# Patient Record
Sex: MALE | Race: WHITE | Employment: UNEMPLOYED | URBAN - METROPOLITAN AREA
[De-identification: names, ages, dates, MRNs, and addresses within clinical notes are randomized per-mention and may not be internally consistent; named-entity substitution may affect disease eponyms.]

---

## 2023-11-27 ENCOUNTER — APPOINTMENT (OUTPATIENT)
Dept: GENERAL RADIOLOGY | Age: 43
End: 2023-11-27

## 2023-11-27 ENCOUNTER — HOSPITAL ENCOUNTER (EMERGENCY)
Age: 43
Discharge: HOME | End: 2023-11-27
Attending: EMERGENCY MEDICINE

## 2023-11-27 VITALS
RESPIRATION RATE: 18 BRPM | WEIGHT: 230 LBS | HEART RATE: 124 BPM | OXYGEN SATURATION: 98 % | TEMPERATURE: 97.9 F | BODY MASS INDEX: 32.2 KG/M2 | HEIGHT: 71 IN | SYSTOLIC BLOOD PRESSURE: 127 MMHG | DIASTOLIC BLOOD PRESSURE: 91 MMHG

## 2023-11-27 DIAGNOSIS — T78.40XA ALLERGIC REACTION, INITIAL ENCOUNTER: Primary | ICD-10-CM

## 2023-11-27 DIAGNOSIS — L50.9 URTICARIA: ICD-10-CM

## 2023-11-27 DIAGNOSIS — I50.9 ACUTE ON CHRONIC CONGESTIVE HEART FAILURE, UNSPECIFIED HEART FAILURE TYPE (HCC): ICD-10-CM

## 2023-11-27 LAB
ALBUMIN SERPL-MCNC: 3.3 G/DL (ref 3.5–5)
ALBUMIN/GLOB SERPL: 1.4 (ref 0.4–1.6)
ALP SERPL-CCNC: 85 U/L (ref 50–136)
ALT SERPL-CCNC: 145 U/L (ref 12–65)
ANION GAP SERPL CALC-SCNC: 5 MMOL/L (ref 2–11)
AST SERPL-CCNC: 52 U/L (ref 15–37)
BASOPHILS # BLD: 0 K/UL (ref 0–0.2)
BASOPHILS NFR BLD: 0 % (ref 0–2)
BILIRUB SERPL-MCNC: 0.9 MG/DL (ref 0.2–1.1)
BUN SERPL-MCNC: 16 MG/DL (ref 6–23)
CALCIUM SERPL-MCNC: 8.5 MG/DL (ref 8.3–10.4)
CHLORIDE SERPL-SCNC: 110 MMOL/L (ref 101–110)
CO2 SERPL-SCNC: 25 MMOL/L (ref 21–32)
CREAT SERPL-MCNC: 1.06 MG/DL (ref 0.8–1.5)
DIFFERENTIAL METHOD BLD: ABNORMAL
EOSINOPHIL # BLD: 0.3 K/UL (ref 0–0.8)
EOSINOPHIL NFR BLD: 3 % (ref 0.5–7.8)
ERYTHROCYTE [DISTWIDTH] IN BLOOD BY AUTOMATED COUNT: 12.5 % (ref 11.9–14.6)
GLOBULIN SER CALC-MCNC: 2.3 G/DL (ref 2.8–4.5)
GLUCOSE SERPL-MCNC: 141 MG/DL (ref 65–100)
HCT VFR BLD AUTO: 44.7 % (ref 41.1–50.3)
HGB BLD-MCNC: 14.9 G/DL (ref 13.6–17.2)
IMM GRANULOCYTES # BLD AUTO: 0.1 K/UL (ref 0–0.5)
IMM GRANULOCYTES NFR BLD AUTO: 1 % (ref 0–5)
LYMPHOCYTES # BLD: 0.8 K/UL (ref 0.5–4.6)
LYMPHOCYTES NFR BLD: 7 % (ref 13–44)
MCH RBC QN AUTO: 32.4 PG (ref 26.1–32.9)
MCHC RBC AUTO-ENTMCNC: 33.3 G/DL (ref 31.4–35)
MCV RBC AUTO: 97.2 FL (ref 82–102)
MONOCYTES # BLD: 0.2 K/UL (ref 0.1–1.3)
MONOCYTES NFR BLD: 2 % (ref 4–12)
NEUTS SEG # BLD: 10.1 K/UL (ref 1.7–8.2)
NEUTS SEG NFR BLD: 88 % (ref 43–78)
NRBC # BLD: 0 K/UL (ref 0–0.2)
NT PRO BNP: 2419 PG/ML (ref 5–125)
PLATELET # BLD AUTO: 222 K/UL (ref 150–450)
PMV BLD AUTO: 9.7 FL (ref 9.4–12.3)
POTASSIUM SERPL-SCNC: 4.3 MMOL/L (ref 3.5–5.1)
PROT SERPL-MCNC: 5.6 G/DL (ref 6.3–8.2)
RBC # BLD AUTO: 4.6 M/UL (ref 4.23–5.6)
SODIUM SERPL-SCNC: 140 MMOL/L (ref 133–143)
WBC # BLD AUTO: 11.5 K/UL (ref 4.3–11.1)

## 2023-11-27 PROCEDURE — 94664 DEMO&/EVAL PT USE INHALER: CPT

## 2023-11-27 PROCEDURE — 94640 AIRWAY INHALATION TREATMENT: CPT

## 2023-11-27 PROCEDURE — 94760 N-INVAS EAR/PLS OXIMETRY 1: CPT

## 2023-11-27 PROCEDURE — 6360000002 HC RX W HCPCS: Performed by: EMERGENCY MEDICINE

## 2023-11-27 PROCEDURE — 80053 COMPREHEN METABOLIC PANEL: CPT

## 2023-11-27 PROCEDURE — 85025 COMPLETE CBC W/AUTO DIFF WBC: CPT

## 2023-11-27 PROCEDURE — 99285 EMERGENCY DEPT VISIT HI MDM: CPT

## 2023-11-27 PROCEDURE — 2580000003 HC RX 258: Performed by: EMERGENCY MEDICINE

## 2023-11-27 PROCEDURE — 96374 THER/PROPH/DIAG INJ IV PUSH: CPT

## 2023-11-27 PROCEDURE — 96375 TX/PRO/DX INJ NEW DRUG ADDON: CPT

## 2023-11-27 PROCEDURE — 83880 ASSAY OF NATRIURETIC PEPTIDE: CPT

## 2023-11-27 PROCEDURE — 2500000003 HC RX 250 WO HCPCS: Performed by: EMERGENCY MEDICINE

## 2023-11-27 PROCEDURE — A4216 STERILE WATER/SALINE, 10 ML: HCPCS | Performed by: EMERGENCY MEDICINE

## 2023-11-27 PROCEDURE — 71046 X-RAY EXAM CHEST 2 VIEWS: CPT

## 2023-11-27 PROCEDURE — 93005 ELECTROCARDIOGRAM TRACING: CPT | Performed by: EMERGENCY MEDICINE

## 2023-11-27 PROCEDURE — 6370000000 HC RX 637 (ALT 250 FOR IP): Performed by: EMERGENCY MEDICINE

## 2023-11-27 RX ORDER — FUROSEMIDE 10 MG/ML
40 INJECTION INTRAMUSCULAR; INTRAVENOUS ONCE
Status: DISCONTINUED | OUTPATIENT
Start: 2023-11-27 | End: 2023-11-27 | Stop reason: HOSPADM

## 2023-11-27 RX ORDER — DIPHENHYDRAMINE HYDROCHLORIDE 50 MG/ML
25 INJECTION INTRAMUSCULAR; INTRAVENOUS
Status: COMPLETED | OUTPATIENT
Start: 2023-11-27 | End: 2023-11-27

## 2023-11-27 RX ORDER — FUROSEMIDE 20 MG/1
20 TABLET ORAL DAILY
Qty: 30 TABLET | Refills: 0 | Status: SHIPPED | OUTPATIENT
Start: 2023-11-27 | End: 2023-12-27

## 2023-11-27 RX ORDER — IPRATROPIUM BROMIDE AND ALBUTEROL SULFATE 2.5; .5 MG/3ML; MG/3ML
1 SOLUTION RESPIRATORY (INHALATION)
Status: COMPLETED | OUTPATIENT
Start: 2023-11-27 | End: 2023-11-27

## 2023-11-27 RX ORDER — PREDNISONE 50 MG/1
50 TABLET ORAL DAILY
Qty: 5 TABLET | Refills: 0 | Status: SHIPPED | OUTPATIENT
Start: 2023-11-27 | End: 2023-12-02

## 2023-11-27 RX ADMIN — IPRATROPIUM BROMIDE AND ALBUTEROL SULFATE 1 DOSE: .5; 3 SOLUTION RESPIRATORY (INHALATION) at 14:13

## 2023-11-27 RX ADMIN — DIPHENHYDRAMINE HYDROCHLORIDE 25 MG: 50 INJECTION INTRAMUSCULAR; INTRAVENOUS at 14:21

## 2023-11-27 RX ADMIN — WATER 125 MG: 1 INJECTION INTRAMUSCULAR; INTRAVENOUS; SUBCUTANEOUS at 14:22

## 2023-11-27 RX ADMIN — FAMOTIDINE 20 MG: 10 INJECTION, SOLUTION INTRAVENOUS at 14:21

## 2023-11-27 NOTE — CARE COORDINATION
SW met with patient who is not currently insured or established with primary care. Self-pay resources provided to include [employer], [employer], [employer], and [employer] [facility]. Verbal education provided on each resources and how they could be of benefit. No additional needs from JOSE at this time.      El Rios LMSW    [address] [facility]

## 2023-11-27 NOTE — ED PROVIDER NOTES
Emergency Department Provider Note       PCP: No, Pcp   Age: 37 y.o. Sex: male     DISPOSITION Decision To Discharge 11/27/2023 03:37:12 PM       ICD-10-CM    1. Allergic reaction, initial encounter  T78.40XA       2. Urticaria  L50.9       3. Acute on chronic congestive heart failure, unspecified heart failure type ([address])  I50.9           Medical Decision Making     Complexity of Problems Addressed:  1 or more acute illnesses that pose a threat to life or bodily function. Data Reviewed and Analyzed:   I independently ordered and reviewed each unique test.             I independently interpreted the cardiac monitor rhythm strip sinus. I interpreted the X-rays slight fluid overload. Discussion of management or test interpretation. Patient looks much better on reevaluation. His rash and breathing are improved. He was given IV Lasix as well as he does have a BNP that is been elevated some fluid on his chest x-ray. This is likely unrelated as he was having this prior to the allergic reaction. He was seen by case management and given resources for medication and have also filled out a referral for [facility] cardiology. Risk of Complications and/or Morbidity of Patient Management:  Prescription drug management performed. Patient was discharged risks and benefits of hospitalization were considered. Shared medical decision making was utilized in creating the patients health plan today. Is this patient to be included in the SEP-1 core measure due to severe sepsis or septic shock? No Exclusion criteria - the patient is NOT to be included for SEP-1 Core Measure due to: Infection is not suspected      History      Gala De La Cruz is a 37 y.o. male   Patient reports he has had some lower extremity swelling for few days he is also had some upper respiratory symptoms including congestion and some cough. Today he took some naproxen which she does not normally take for this.   Since that time he has

## 2023-11-27 NOTE — ED TRIAGE NOTES
Pt thought he had allergies- had nasal congestion with green snot, started taking allergy medicine but stopped when he started feeling better. Pt had a headache last night and took aleve. Woke up this morning with facial swelling along with hand and feet swelling. Pt also c/o SOB. Pt also has red rash and c/o diarrhea today.

## 2023-11-28 LAB
EKG ATRIAL RATE: 128 BPM
EKG DIAGNOSIS: NORMAL
EKG P AXIS: 56 DEGREES
EKG P-R INTERVAL: 134 MS
EKG Q-T INTERVAL: 321 MS
EKG QRS DURATION: 88 MS
EKG QTC CALCULATION (BAZETT): 469 MS
EKG R AXIS: 72 DEGREES
EKG T AXIS: 251 DEGREES
EKG VENTRICULAR RATE: 128 BPM

## 2023-11-28 PROCEDURE — 93010 ELECTROCARDIOGRAM REPORT: CPT | Performed by: INTERNAL MEDICINE

## 2023-12-07 ENCOUNTER — OFFICE VISIT (OUTPATIENT)
Age: 43
End: 2023-12-07

## 2023-12-07 VITALS
DIASTOLIC BLOOD PRESSURE: 84 MMHG | BODY MASS INDEX: 30.66 KG/M2 | HEIGHT: 71 IN | SYSTOLIC BLOOD PRESSURE: 108 MMHG | HEART RATE: 56 BPM | WEIGHT: 219 LBS

## 2023-12-07 DIAGNOSIS — R89.9 ABNORMAL LABORATORY TEST: Primary | ICD-10-CM

## 2023-12-07 PROCEDURE — 99204 OFFICE O/P NEW MOD 45 MIN: CPT | Performed by: INTERNAL MEDICINE

## 2023-12-07 NOTE — PROGRESS NOTES
[address] [place]  [address] [address]  PHONE: [phone]    SUBJECTIVE: Gena Worley (1980) is a 37 y.o. male seen for a follow up visit regarding the following:   Specialty Problems    None    New patient presents for follow up after ED visit for allergic rxn where he was noted to have pedal edema and an elevated BNP. Patient endorses slight pedal edema that he has had prior to allergic reaction. 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 Took the Lasix for a couple days but felt he was dried out by them. Smokes a pack a day for many years. Family hx positive for multiple cardiac events. States he used to have HTN that he was prescribed medication for but never took. Has not seen a PCP in many years. Denies exertional chest pain; ednorses dyspnea. Denies orthopnea. Denies palpitations, presyncopal or syncopal episodes. Past Medical History, Past Surgical History, Family history, Social History, and Medications were all reviewed with the patient today and updated as necessary. Allergies   Allergen Reactions    Aleve [Naproxen] Hives     Past Medical History:   Diagnosis Date    Kidney stone      Past Surgical History:   Procedure Laterality Date    TYMPANOSTOMY TUBE PLACEMENT       No family history on file.    Social History     Tobacco Use    Smoking status: Every Day     Packs/day: 1     Types: Cigarettes    Smokeless tobacco: Never   Substance Use Topics    Alcohol use: Never       Current Outpatient Medications:     furosemide (LASIX) 20 MG tablet, Take 1 tablet by mouth daily (Patient not taking: Reported on 12/7/2023), Disp: 30 tablet, Rfl: 0    ROS:  Review of Systems - General ROS: negative for - chills, fatigue or fever  Hematological and Lymphatic ROS: negative for - bleeding problems, bruising or jaundice  Respiratory ROS: cough with sputum production  Cardiovascular ROS:

## 2023-12-29 ENCOUNTER — APPOINTMENT (OUTPATIENT)
Dept: GENERAL RADIOLOGY | Age: 43
DRG: 293 | End: 2023-12-29
Attending: INTERNAL MEDICINE
Payer: COMMERCIAL

## 2023-12-29 ENCOUNTER — DIRECT ADMIT ORDERS (OUTPATIENT)
Age: 43
End: 2023-12-29

## 2023-12-29 ENCOUNTER — HOSPITAL ENCOUNTER (INPATIENT)
Age: 43
LOS: 4 days | Discharge: HOME | DRG: 293 | End: 2024-01-02
Attending: INTERNAL MEDICINE | Admitting: INTERNAL MEDICINE
Payer: COMMERCIAL

## 2023-12-29 ENCOUNTER — OFFICE VISIT (OUTPATIENT)
Age: 43
End: 2023-12-29

## 2023-12-29 VITALS
HEART RATE: 133 BPM | DIASTOLIC BLOOD PRESSURE: 100 MMHG | BODY MASS INDEX: 30.66 KG/M2 | HEIGHT: 71 IN | WEIGHT: 219 LBS | SYSTOLIC BLOOD PRESSURE: 124 MMHG

## 2023-12-29 DIAGNOSIS — I50.21 ACUTE SYSTOLIC HEART FAILURE (HCC): Primary | ICD-10-CM

## 2023-12-29 DIAGNOSIS — I10 ESSENTIAL HYPERTENSION: ICD-10-CM

## 2023-12-29 DIAGNOSIS — I38 VALVULAR HEART DISEASE: ICD-10-CM

## 2023-12-29 DIAGNOSIS — I50.41 ACUTE COMBINED SYSTOLIC AND DIASTOLIC HEART FAILURE (HCC): Primary | ICD-10-CM

## 2023-12-29 DIAGNOSIS — Z72.0 TOBACCO USE: ICD-10-CM

## 2023-12-29 PROBLEM — E87.70 VOLUME OVERLOAD: Status: ACTIVE | Noted: 2023-12-29

## 2023-12-29 LAB
ALBUMIN SERPL-MCNC: 3.1 G/DL (ref 3.5–5)
ALBUMIN/GLOB SERPL: 1.1 (ref 0.4–1.6)
ALP SERPL-CCNC: 79 U/L (ref 50–136)
ALT SERPL-CCNC: 70 U/L (ref 12–65)
AMPHET UR QL SCN: POSITIVE
ANION GAP SERPL CALC-SCNC: 8 MMOL/L (ref 2–11)
AST SERPL-CCNC: 43 U/L (ref 15–37)
B PERT DNA SPEC QL NAA+PROBE: NOT DETECTED
BARBITURATES UR QL SCN: NEGATIVE
BENZODIAZ UR QL: NEGATIVE
BILIRUB SERPL-MCNC: 0.5 MG/DL (ref 0.2–1.1)
BORDETELLA PARAPERTUSSIS BY PCR: NOT DETECTED
BUN SERPL-MCNC: 25 MG/DL (ref 6–23)
C PNEUM DNA SPEC QL NAA+PROBE: NOT DETECTED
CALCIUM SERPL-MCNC: 8.9 MG/DL (ref 8.3–10.4)
CANNABINOIDS UR QL SCN: NEGATIVE
CHLORIDE SERPL-SCNC: 110 MMOL/L (ref 103–113)
CO2 SERPL-SCNC: 24 MMOL/L (ref 21–32)
COCAINE UR QL SCN: NEGATIVE
CREAT SERPL-MCNC: 1.2 MG/DL (ref 0.8–1.5)
EKG ATRIAL RATE: 128 BPM
EKG DIAGNOSIS: NORMAL
EKG P AXIS: 73 DEGREES
EKG P-R INTERVAL: 138 MS
EKG Q-T INTERVAL: 318 MS
EKG QRS DURATION: 88 MS
EKG QTC CALCULATION (BAZETT): 464 MS
EKG R AXIS: 84 DEGREES
EKG T AXIS: -37 DEGREES
EKG VENTRICULAR RATE: 128 BPM
ERYTHROCYTE [DISTWIDTH] IN BLOOD BY AUTOMATED COUNT: 13.2 % (ref 11.9–14.6)
FLUAV SUBTYP SPEC NAA+PROBE: NOT DETECTED
FLUBV RNA SPEC QL NAA+PROBE: NOT DETECTED
GLOBULIN SER CALC-MCNC: 2.9 G/DL (ref 2.8–4.5)
GLUCOSE SERPL-MCNC: 130 MG/DL (ref 65–100)
HADV DNA SPEC QL NAA+PROBE: NOT DETECTED
HCOV 229E RNA SPEC QL NAA+PROBE: NOT DETECTED
HCOV HKU1 RNA SPEC QL NAA+PROBE: NOT DETECTED
HCOV NL63 RNA SPEC QL NAA+PROBE: NOT DETECTED
HCOV OC43 RNA SPEC QL NAA+PROBE: NOT DETECTED
HCT VFR BLD AUTO: 44.5 % (ref 41.1–50.3)
HGB BLD-MCNC: 14.4 G/DL (ref 13.6–17.2)
HMPV RNA SPEC QL NAA+PROBE: NOT DETECTED
HPIV1 RNA SPEC QL NAA+PROBE: NOT DETECTED
HPIV2 RNA SPEC QL NAA+PROBE: NOT DETECTED
HPIV3 RNA SPEC QL NAA+PROBE: NOT DETECTED
HPIV4 RNA SPEC QL NAA+PROBE: NOT DETECTED
M PNEUMO DNA SPEC QL NAA+PROBE: NOT DETECTED
MAGNESIUM SERPL-MCNC: 2 MG/DL (ref 1.8–2.4)
MCH RBC QN AUTO: 31.6 PG (ref 26.1–32.9)
MCHC RBC AUTO-ENTMCNC: 32.4 G/DL (ref 31.4–35)
MCV RBC AUTO: 97.8 FL (ref 82–102)
METHADONE UR QL: NEGATIVE
NRBC # BLD: 0 K/UL (ref 0–0.2)
OPIATES UR QL: NEGATIVE
PCP UR QL: NEGATIVE
PLATELET # BLD AUTO: 264 K/UL (ref 150–450)
PMV BLD AUTO: 9.8 FL (ref 9.4–12.3)
POTASSIUM SERPL-SCNC: 4 MMOL/L (ref 3.5–5.1)
PROT SERPL-MCNC: 6 G/DL (ref 6.3–8.2)
RBC # BLD AUTO: 4.55 M/UL (ref 4.23–5.6)
RSV RNA SPEC QL NAA+PROBE: NOT DETECTED
RV+EV RNA SPEC QL NAA+PROBE: NOT DETECTED
SARS-COV-2 RNA RESP QL NAA+PROBE: NOT DETECTED
SODIUM SERPL-SCNC: 142 MMOL/L (ref 136–146)
WBC # BLD AUTO: 11.4 K/UL (ref 4.3–11.1)

## 2023-12-29 PROCEDURE — 94762 N-INVAS EAR/PLS OXIMTRY CONT: CPT

## 2023-12-29 PROCEDURE — 80307 DRUG TEST PRSMV CHEM ANLYZR: CPT

## 2023-12-29 PROCEDURE — G0378 HOSPITAL OBSERVATION PER HR: HCPCS

## 2023-12-29 PROCEDURE — 99223 1ST HOSP IP/OBS HIGH 75: CPT | Performed by: INTERNAL MEDICINE

## 2023-12-29 PROCEDURE — 96374 THER/PROPH/DIAG INJ IV PUSH: CPT

## 2023-12-29 PROCEDURE — 2580000003 HC RX 258

## 2023-12-29 PROCEDURE — 6360000002 HC RX W HCPCS

## 2023-12-29 PROCEDURE — 6360000002 HC RX W HCPCS: Performed by: NURSE PRACTITIONER

## 2023-12-29 PROCEDURE — 0202U NFCT DS 22 TRGT SARS-COV-2: CPT

## 2023-12-29 PROCEDURE — 94640 AIRWAY INHALATION TREATMENT: CPT

## 2023-12-29 PROCEDURE — 85027 COMPLETE CBC AUTOMATED: CPT

## 2023-12-29 PROCEDURE — 36415 COLL VENOUS BLD VENIPUNCTURE: CPT

## 2023-12-29 PROCEDURE — 83735 ASSAY OF MAGNESIUM: CPT

## 2023-12-29 PROCEDURE — 93000 ELECTROCARDIOGRAM COMPLETE: CPT | Performed by: INTERNAL MEDICINE

## 2023-12-29 PROCEDURE — 80053 COMPREHEN METABOLIC PANEL: CPT

## 2023-12-29 PROCEDURE — 71045 X-RAY EXAM CHEST 1 VIEW: CPT

## 2023-12-29 PROCEDURE — 6370000000 HC RX 637 (ALT 250 FOR IP)

## 2023-12-29 RX ORDER — SODIUM CHLORIDE 0.9 % (FLUSH) 0.9 %
5-40 SYRINGE (ML) INJECTION PRN
Status: DISCONTINUED | OUTPATIENT
Start: 2023-12-29 | End: 2024-01-02 | Stop reason: HOSPADM

## 2023-12-29 RX ORDER — CARVEDILOL 3.12 MG/1
3.12 TABLET ORAL 2 TIMES DAILY WITH MEALS
Status: DISCONTINUED | OUTPATIENT
Start: 2023-12-29 | End: 2024-01-02

## 2023-12-29 RX ORDER — LANOLIN ALCOHOL/MO/W.PET/CERES
6 CREAM (GRAM) TOPICAL NIGHTLY PRN
Status: DISCONTINUED | OUTPATIENT
Start: 2023-12-29 | End: 2024-01-02 | Stop reason: HOSPADM

## 2023-12-29 RX ORDER — MAGNESIUM SULFATE IN WATER 40 MG/ML
2000 INJECTION, SOLUTION INTRAVENOUS PRN
Status: DISCONTINUED | OUTPATIENT
Start: 2023-12-29 | End: 2024-01-02 | Stop reason: HOSPADM

## 2023-12-29 RX ORDER — POTASSIUM CHLORIDE 7.45 MG/ML
10 INJECTION INTRAVENOUS PRN
Status: DISCONTINUED | OUTPATIENT
Start: 2023-12-29 | End: 2024-01-02 | Stop reason: HOSPADM

## 2023-12-29 RX ORDER — NITROGLYCERIN 0.4 MG/1
0.4 TABLET SUBLINGUAL EVERY 5 MIN PRN
Status: DISCONTINUED | OUTPATIENT
Start: 2023-12-29 | End: 2024-01-02 | Stop reason: HOSPADM

## 2023-12-29 RX ORDER — ACETAMINOPHEN 325 MG/1
650 TABLET ORAL EVERY 6 HOURS PRN
Status: DISCONTINUED | OUTPATIENT
Start: 2023-12-29 | End: 2024-01-02 | Stop reason: HOSPADM

## 2023-12-29 RX ORDER — SODIUM CHLORIDE 9 MG/ML
INJECTION, SOLUTION INTRAVENOUS PRN
Status: DISCONTINUED | OUTPATIENT
Start: 2023-12-29 | End: 2024-01-02 | Stop reason: HOSPADM

## 2023-12-29 RX ORDER — POTASSIUM CHLORIDE 20 MEQ/1
40 TABLET, EXTENDED RELEASE ORAL PRN
Status: DISCONTINUED | OUTPATIENT
Start: 2023-12-29 | End: 2024-01-02 | Stop reason: HOSPADM

## 2023-12-29 RX ORDER — ALBUTEROL SULFATE 90 UG/1
AEROSOL, METERED RESPIRATORY (INHALATION)
Status: ON HOLD | COMMUNITY
Start: 2023-12-20

## 2023-12-29 RX ORDER — POLYETHYLENE GLYCOL 3350 17 G/17G
17 POWDER, FOR SOLUTION ORAL DAILY PRN
Status: DISCONTINUED | OUTPATIENT
Start: 2023-12-29 | End: 2024-01-02 | Stop reason: HOSPADM

## 2023-12-29 RX ORDER — FUROSEMIDE 10 MG/ML
40 INJECTION INTRAMUSCULAR; INTRAVENOUS 2 TIMES DAILY
Status: DISCONTINUED | OUTPATIENT
Start: 2023-12-29 | End: 2024-01-01

## 2023-12-29 RX ORDER — HYDROXYZINE HYDROCHLORIDE 10 MG/1
10 TABLET, FILM COATED ORAL 3 TIMES DAILY PRN
Status: DISCONTINUED | OUTPATIENT
Start: 2023-12-29 | End: 2024-01-02 | Stop reason: HOSPADM

## 2023-12-29 RX ORDER — SODIUM CHLORIDE 0.9 % (FLUSH) 0.9 %
5-40 SYRINGE (ML) INJECTION EVERY 12 HOURS SCHEDULED
Status: DISCONTINUED | OUTPATIENT
Start: 2023-12-29 | End: 2024-01-02 | Stop reason: HOSPADM

## 2023-12-29 RX ORDER — LORAZEPAM 0.5 MG/1
0.5 TABLET ORAL ONCE
Status: COMPLETED | OUTPATIENT
Start: 2023-12-29 | End: 2023-12-29

## 2023-12-29 RX ORDER — NICOTINE 21 MG/24HR
1 PATCH, TRANSDERMAL 24 HOURS TRANSDERMAL DAILY PRN
Status: DISCONTINUED | OUTPATIENT
Start: 2023-12-29 | End: 2024-01-02 | Stop reason: HOSPADM

## 2023-12-29 RX ORDER — ONDANSETRON 4 MG/1
4 TABLET, ORALLY DISINTEGRATING ORAL EVERY 8 HOURS PRN
Status: DISCONTINUED | OUTPATIENT
Start: 2023-12-29 | End: 2024-01-02 | Stop reason: HOSPADM

## 2023-12-29 RX ORDER — ONDANSETRON 2 MG/ML
4 INJECTION INTRAMUSCULAR; INTRAVENOUS EVERY 6 HOURS PRN
Status: DISCONTINUED | OUTPATIENT
Start: 2023-12-29 | End: 2024-01-02 | Stop reason: HOSPADM

## 2023-12-29 RX ORDER — ALBUTEROL SULFATE 90 UG/1
2 AEROSOL, METERED RESPIRATORY (INHALATION) EVERY 4 HOURS PRN
Status: DISCONTINUED | OUTPATIENT
Start: 2023-12-29 | End: 2024-01-02 | Stop reason: HOSPADM

## 2023-12-29 RX ORDER — ALBUTEROL SULFATE 90 UG/1
2 AEROSOL, METERED RESPIRATORY (INHALATION)
Status: DISCONTINUED | OUTPATIENT
Start: 2023-12-29 | End: 2023-12-29

## 2023-12-29 RX ADMIN — SODIUM CHLORIDE, PRESERVATIVE FREE 10 ML: 5 INJECTION INTRAVENOUS at 19:57

## 2023-12-29 RX ADMIN — Medication 6 MG: at 19:57

## 2023-12-29 RX ADMIN — CARVEDILOL 3.12 MG: 3.12 TABLET, FILM COATED ORAL at 15:58

## 2023-12-29 RX ADMIN — FUROSEMIDE 40 MG: 10 INJECTION, SOLUTION INTRAMUSCULAR; INTRAVENOUS at 16:46

## 2023-12-29 RX ADMIN — HYDROXYZINE HYDROCHLORIDE 10 MG: 10 TABLET ORAL at 19:57

## 2023-12-29 RX ADMIN — LORAZEPAM 0.5 MG: 0.5 TABLET ORAL at 15:58

## 2023-12-29 RX ADMIN — ALBUTEROL SULFATE 2 PUFF: 90 AEROSOL, METERED RESPIRATORY (INHALATION) at 16:24

## 2023-12-29 RX ADMIN — IPRATROPIUM BROMIDE 0.5 MG: 0.5 SOLUTION RESPIRATORY (INHALATION) at 21:34

## 2023-12-29 ASSESSMENT — PAIN SCALES - GENERAL
PAINLEVEL_OUTOF10: 0
PAINLEVEL_OUTOF10: 0

## 2023-12-29 NOTE — H&P
[facility] CARDIOLOGY  [address]  PHONE: [phone]      23    NAME:  Stevie Riggs  : 1980  MRN: [id number]         SUBJECTIVE:   Stevie Riggs is a 37 y.o. male seen for a follow up visit regarding the following:     No chief complaint on file. HPI:  Follow up  No chief complaint on file. .    Worked in as acute care following echocardiogram.  Just recently met Dr Valentino Bella as a new patient having been to the ER for an allergic reaction and noted to have pedal edema and elevated NT-pro BNP. He took lasix from the ER for a couple of days, felt depleted so stopped it. Smoker with strong family history heart disease. Was prescribed antihypertensives in years past but never took them and has not sought routine preventive care. Dr VARMA placed him back on lasix and arranged the echo. Notably, his BP at that consult visit was 108/84. He presented today for his echo with heart rate 130 and echo with 4 chamber dilatation, severe AV valve regurgitation and severely depressed EF and RVSP 61. Sleep apnea was suspected and sleep study recommended. He remains short of breath but says he otherwise feels ok. He has orthopnea, with positional cough, intermittent hemoptysis. He started with a cough about 2-3 months ago, worse with exertion and associated with wheezing, started to have progressively worsening exertional dyspnea, then sputum became productive and occasionally blood tinged, progressed to worsening fatigue and relatively sudden pedal edema about a month ago. He went to the ER when he woke up and had facial swelling and hives, attributed to Aleve which he had taken for the legs and has subsequently stopped. The lasix doesn't really seem to have helped. No evidence of fever.       PAST CARDIAC HISTORY:  Dec 2023       4 chamber dilatation - EF 25%, severe MR/TR, RVSP 61.             Key CAD CHF Meds            furosemide (LASIX) 20

## 2023-12-29 NOTE — PLAN OF CARE
Problem: Discharge Planning  Goal: Discharge to home or other facility with appropriate resources  Outcome: Progressing  Flowsheets (Taken 12/29/2023 1544)  Discharge to home or other facility with appropriate resources: Identify barriers to discharge with patient and caregiver     Problem: Safety - Adult  Goal: Free from fall injury  Outcome: Progressing     Problem: ABCDS Injury Assessment  Goal: Absence of physical injury  Outcome: Progressing     Problem: Risk for Elopement  Goal: Patient will not exit the unit/facility without proper excort  Outcome: Progressing  Flowsheets (Taken 12/29/2023 1500)  Nursing Interventions for Elopement Risk: Assist with personal care needs such as toileting, eating, dressing, as needed to reduce the risk of wandering

## 2023-12-29 NOTE — MANAGEMENT PLAN
See Dr. Aburto's H&P. Ordered labs, CXR, EKG. Started lasix 40 IV bid. Started low dose Coreg to titrate up & allow bp room w/ diuresis. MRA & jardiance not added on arrival so as not to start everything all at once & pending labwork. Patient severely anxious on arrival. Increased height of head of bed. Room air saturation is 96% & not actively short of breath while talking. Ordered UDS due to agitation & then one-time dose of ativan for anxiety/ agitation. Ordered prn atarax thereafter for anxiety & prn melatonin for sleep, as patient states hasn't slept well/ much at all in many days. Ordered overnight pulse oximetry based on Dr. Aburto's notes on need for future sleep study.

## 2023-12-29 NOTE — PROGRESS NOTES
[facility] CARDIOLOGY  [address] [place] [address], [facility] [address]  PHONE: [phone]      23    NAME:  Yobany Olivera  : 1980  MRN: [id number]         SUBJECTIVE:   Yobany Olivera is a 37 y.o. male seen for a follow up visit regarding the following:     Chief Complaint   Patient presents with    Congestive Heart Failure            HPI:  Follow up  Congestive Heart Failure   . Worked in as acute care following echocardiogram.  Just recently met Dr Elizabeth Felipe as a new patient having been to the ER for an allergic reaction and noted to have pedal edema and elevated NT-pro BNP. He took lasix from the ER for a couple of days, felt depleted so stopped it. Smoker with strong family history heart disease. Was prescribed antihypertensives in years past but never took them and has not sought routine preventive care. Dr VARMA placed him back on lasix and arranged the echo. Notably, his BP at that consult visit was 108/84. He presented today for his echo with heart rate 130 and echo with 4 chamber dilatation, severe AV valve regurgitation and severely depressed EF and RVSP 61. Sleep apnea was suspected and sleep study recommended. He remains short of breath but says he otherwise feels ok. He has orthopnea, with positional cough, intermittent hemoptysis. He started with a cough about 2-3 months ago, worse with exertion and associated with wheezing, started to have progressively worsening exertional dyspnea, then sputum became productive and occasionally blood tinged, progressed to worsening fatigue and relatively sudden pedal edema about a month ago. He went to the ER when he woke up and had facial swelling and hives, attributed to Aleve which he had taken for the legs and has subsequently stopped. The lasix doesn't really seem to have helped. No evidence of fever.       PAST CARDIAC HISTORY:  Dec 2023       4 chamber dilatation - EF 25%, severe MR/TR, RVSP 61.             Key CAD CHF

## 2023-12-30 LAB
ANION GAP SERPL CALC-SCNC: 2 MMOL/L (ref 2–11)
BUN SERPL-MCNC: 23 MG/DL (ref 6–23)
CALCIUM SERPL-MCNC: 8.4 MG/DL (ref 8.3–10.4)
CHLORIDE SERPL-SCNC: 108 MMOL/L (ref 103–113)
CHOLEST SERPL-MCNC: 157 MG/DL
CO2 SERPL-SCNC: 30 MMOL/L (ref 21–32)
CREAT SERPL-MCNC: 1.2 MG/DL (ref 0.8–1.5)
ERYTHROCYTE [DISTWIDTH] IN BLOOD BY AUTOMATED COUNT: 13.2 % (ref 11.9–14.6)
EST. AVERAGE GLUCOSE BLD GHB EST-MCNC: 117 MG/DL
GLUCOSE SERPL-MCNC: 137 MG/DL (ref 65–100)
HBA1C MFR BLD: 5.7 % (ref 4.8–5.6)
HCT VFR BLD AUTO: 45.5 % (ref 41.1–50.3)
HDLC SERPL-MCNC: 44 MG/DL (ref 40–60)
HDLC SERPL: 3.6
HGB BLD-MCNC: 14.6 G/DL (ref 13.6–17.2)
LDLC SERPL CALC-MCNC: 80.8 MG/DL
MAGNESIUM SERPL-MCNC: 2.1 MG/DL (ref 1.8–2.4)
MCH RBC QN AUTO: 31.7 PG (ref 26.1–32.9)
MCHC RBC AUTO-ENTMCNC: 32.1 G/DL (ref 31.4–35)
MCV RBC AUTO: 98.9 FL (ref 82–102)
NRBC # BLD: 0 K/UL (ref 0–0.2)
PLATELET # BLD AUTO: 248 K/UL (ref 150–450)
PMV BLD AUTO: 9.7 FL (ref 9.4–12.3)
POTASSIUM SERPL-SCNC: 4 MMOL/L (ref 3.5–5.1)
RBC # BLD AUTO: 4.6 M/UL (ref 4.23–5.6)
SODIUM SERPL-SCNC: 140 MMOL/L (ref 136–146)
TRIGL SERPL-MCNC: 161 MG/DL (ref 35–150)
VLDLC SERPL CALC-MCNC: 32.2 MG/DL (ref 6–23)
WBC # BLD AUTO: 10.7 K/UL (ref 4.3–11.1)

## 2023-12-30 PROCEDURE — 6360000002 HC RX W HCPCS

## 2023-12-30 PROCEDURE — 80061 LIPID PANEL: CPT

## 2023-12-30 PROCEDURE — 83036 HEMOGLOBIN GLYCOSYLATED A1C: CPT

## 2023-12-30 PROCEDURE — 94640 AIRWAY INHALATION TREATMENT: CPT

## 2023-12-30 PROCEDURE — 6360000002 HC RX W HCPCS: Performed by: NURSE PRACTITIONER

## 2023-12-30 PROCEDURE — 85027 COMPLETE CBC AUTOMATED: CPT

## 2023-12-30 PROCEDURE — 94761 N-INVAS EAR/PLS OXIMETRY MLT: CPT

## 2023-12-30 PROCEDURE — 36415 COLL VENOUS BLD VENIPUNCTURE: CPT

## 2023-12-30 PROCEDURE — 83735 ASSAY OF MAGNESIUM: CPT

## 2023-12-30 PROCEDURE — 2580000003 HC RX 258

## 2023-12-30 PROCEDURE — 6370000000 HC RX 637 (ALT 250 FOR IP)

## 2023-12-30 PROCEDURE — 80048 BASIC METABOLIC PNL TOTAL CA: CPT

## 2023-12-30 PROCEDURE — 2140000000 HC CCU INTERMEDIATE R&B

## 2023-12-30 RX ADMIN — CARVEDILOL 3.12 MG: 3.12 TABLET, FILM COATED ORAL at 08:46

## 2023-12-30 RX ADMIN — SODIUM CHLORIDE, PRESERVATIVE FREE 10 ML: 5 INJECTION INTRAVENOUS at 19:59

## 2023-12-30 RX ADMIN — IPRATROPIUM BROMIDE 0.5 MG: 0.5 SOLUTION RESPIRATORY (INHALATION) at 19:42

## 2023-12-30 RX ADMIN — SODIUM CHLORIDE, PRESERVATIVE FREE 10 ML: 5 INJECTION INTRAVENOUS at 10:57

## 2023-12-30 RX ADMIN — HYDROXYZINE HYDROCHLORIDE 10 MG: 10 TABLET ORAL at 22:07

## 2023-12-30 RX ADMIN — IPRATROPIUM BROMIDE 0.5 MG: 0.5 SOLUTION RESPIRATORY (INHALATION) at 10:12

## 2023-12-30 RX ADMIN — FUROSEMIDE 40 MG: 10 INJECTION, SOLUTION INTRAMUSCULAR; INTRAVENOUS at 19:57

## 2023-12-30 RX ADMIN — Medication 6 MG: at 22:07

## 2023-12-30 RX ADMIN — FUROSEMIDE 40 MG: 10 INJECTION, SOLUTION INTRAMUSCULAR; INTRAVENOUS at 10:57

## 2023-12-30 RX ADMIN — CARVEDILOL 3.12 MG: 3.12 TABLET, FILM COATED ORAL at 17:52

## 2023-12-30 RX ADMIN — IPRATROPIUM BROMIDE 0.5 MG: 0.5 SOLUTION RESPIRATORY (INHALATION) at 14:54

## 2023-12-30 ASSESSMENT — PAIN SCALES - GENERAL
PAINLEVEL_OUTOF10: 0

## 2023-12-30 NOTE — PLAN OF CARE
Problem: Discharge Planning  Goal: Discharge to home or other facility with appropriate resources  12/30/2023 0241 by Meena Ambrosio RN  Outcome: Progressing  Flowsheets (Taken 12/29/2023 1957)  Discharge to home or other facility with appropriate resources:   Identify barriers to discharge with patient and caregiver   Arrange for needed discharge resources and transportation as appropriate   Identify discharge learning needs (meds, wound care, etc)  12/29/2023 1550 by Corin Lambert RN  Outcome: Progressing  Flowsheets  Taken 12/29/2023 1544  Discharge to home or other facility with appropriate resources: Identify barriers to discharge with patient and caregiver  Taken 12/29/2023 1515  Discharge to home or other facility with appropriate resources: Identify barriers to discharge with patient and caregiver     Problem: Safety - Adult  Goal: Free from fall injury  12/30/2023 0241 by Meena Ambrosio RN  Outcome: Progressing  Flowsheets (Taken 12/29/2023 1957)  Free From Fall Injury: Instruct family/caregiver on patient safety  12/29/2023 1550 by Corin Lambert RN  Outcome: Progressing  Flowsheets (Taken 12/29/2023 1515)  Free From Fall Injury: Instruct family/caregiver on patient safety     Problem: ABCDS Injury Assessment  Goal: Absence of physical injury  12/30/2023 0241 by Meena Ambrosio RN  Outcome: Progressing  Flowsheets (Taken 12/29/2023 1957)  Absence of Physical Injury: Implement safety measures based on patient assessment  12/29/2023 1550 by Corin Lambert RN  Outcome: Progressing  Flowsheets (Taken 12/29/2023 1515)  Absence of Physical Injury: Implement safety measures based on patient assessment     Problem: Risk for Elopement  Goal: Patient will not exit the unit/facility without proper excort  12/30/2023 0241 by Meena Ambrosio RN  Outcome: Progressing  Flowsheets  Taken 12/30/2023 0025  Nursing Interventions for Elopement Risk: Assist with personal care needs such as toileting, eating,

## 2023-12-31 LAB
ANION GAP SERPL CALC-SCNC: 3 MMOL/L (ref 2–11)
BUN SERPL-MCNC: 27 MG/DL (ref 6–23)
CALCIUM SERPL-MCNC: 8.6 MG/DL (ref 8.3–10.4)
CHLORIDE SERPL-SCNC: 103 MMOL/L (ref 103–113)
CO2 SERPL-SCNC: 32 MMOL/L (ref 21–32)
CREAT SERPL-MCNC: 1.4 MG/DL (ref 0.8–1.5)
ERYTHROCYTE [DISTWIDTH] IN BLOOD BY AUTOMATED COUNT: 13 % (ref 11.9–14.6)
GLUCOSE SERPL-MCNC: 111 MG/DL (ref 65–100)
HCT VFR BLD AUTO: 44.8 % (ref 41.1–50.3)
HGB BLD-MCNC: 14.4 G/DL (ref 13.6–17.2)
MAGNESIUM SERPL-MCNC: 2.2 MG/DL (ref 1.8–2.4)
MCH RBC QN AUTO: 31.9 PG (ref 26.1–32.9)
MCHC RBC AUTO-ENTMCNC: 32.1 G/DL (ref 31.4–35)
MCV RBC AUTO: 99.1 FL (ref 82–102)
NRBC # BLD: 0 K/UL (ref 0–0.2)
PLATELET # BLD AUTO: 240 K/UL (ref 150–450)
PMV BLD AUTO: 10.1 FL (ref 9.4–12.3)
POTASSIUM SERPL-SCNC: 3.5 MMOL/L (ref 3.5–5.1)
RBC # BLD AUTO: 4.52 M/UL (ref 4.23–5.6)
SODIUM SERPL-SCNC: 138 MMOL/L (ref 136–146)
WBC # BLD AUTO: 10 K/UL (ref 4.3–11.1)

## 2023-12-31 PROCEDURE — 6360000002 HC RX W HCPCS: Performed by: NURSE PRACTITIONER

## 2023-12-31 PROCEDURE — 83735 ASSAY OF MAGNESIUM: CPT

## 2023-12-31 PROCEDURE — 80048 BASIC METABOLIC PNL TOTAL CA: CPT

## 2023-12-31 PROCEDURE — 36415 COLL VENOUS BLD VENIPUNCTURE: CPT

## 2023-12-31 PROCEDURE — 85027 COMPLETE CBC AUTOMATED: CPT

## 2023-12-31 PROCEDURE — 6360000002 HC RX W HCPCS

## 2023-12-31 PROCEDURE — 94761 N-INVAS EAR/PLS OXIMETRY MLT: CPT

## 2023-12-31 PROCEDURE — 94640 AIRWAY INHALATION TREATMENT: CPT

## 2023-12-31 PROCEDURE — 2140000000 HC CCU INTERMEDIATE R&B

## 2023-12-31 PROCEDURE — 2580000003 HC RX 258

## 2023-12-31 PROCEDURE — 6370000000 HC RX 637 (ALT 250 FOR IP)

## 2023-12-31 RX ADMIN — CARVEDILOL 3.12 MG: 3.12 TABLET, FILM COATED ORAL at 18:03

## 2023-12-31 RX ADMIN — SODIUM CHLORIDE, PRESERVATIVE FREE 5 ML: 5 INJECTION INTRAVENOUS at 08:19

## 2023-12-31 RX ADMIN — IPRATROPIUM BROMIDE 0.5 MG: 0.5 SOLUTION RESPIRATORY (INHALATION) at 08:52

## 2023-12-31 RX ADMIN — FUROSEMIDE 40 MG: 10 INJECTION, SOLUTION INTRAMUSCULAR; INTRAVENOUS at 18:03

## 2023-12-31 RX ADMIN — SODIUM CHLORIDE, PRESERVATIVE FREE 10 ML: 5 INJECTION INTRAVENOUS at 20:07

## 2023-12-31 RX ADMIN — Medication 6 MG: at 22:13

## 2023-12-31 RX ADMIN — FUROSEMIDE 40 MG: 10 INJECTION, SOLUTION INTRAMUSCULAR; INTRAVENOUS at 08:20

## 2023-12-31 RX ADMIN — IPRATROPIUM BROMIDE 0.5 MG: 0.5 SOLUTION RESPIRATORY (INHALATION) at 19:31

## 2023-12-31 RX ADMIN — HYDROXYZINE HYDROCHLORIDE 10 MG: 10 TABLET ORAL at 22:13

## 2023-12-31 RX ADMIN — CARVEDILOL 3.12 MG: 3.12 TABLET, FILM COATED ORAL at 08:20

## 2023-12-31 ASSESSMENT — PAIN SCALES - GENERAL
PAINLEVEL_OUTOF10: 0

## 2024-01-01 ENCOUNTER — APPOINTMENT (OUTPATIENT)
Dept: CT IMAGING | Age: 44
DRG: 293 | End: 2024-01-01
Attending: INTERNAL MEDICINE
Payer: COMMERCIAL

## 2024-01-01 PROBLEM — I27.20 PULMONARY HYPERTENSION (HCC): Status: ACTIVE | Noted: 2024-01-01

## 2024-01-01 PROBLEM — I07.1 SEVERE TRICUSPID REGURGITATION: Status: ACTIVE | Noted: 2024-01-01

## 2024-01-01 PROBLEM — I50.21 ACUTE SYSTOLIC HEART FAILURE (HCC): Status: ACTIVE | Noted: 2024-01-01

## 2024-01-01 PROBLEM — F15.10 METHAMPHETAMINE USE (HCC): Status: ACTIVE | Noted: 2024-01-01

## 2024-01-01 PROBLEM — I34.0 SEVERE MITRAL REGURGITATION: Status: ACTIVE | Noted: 2024-01-01

## 2024-01-01 LAB
ANION GAP SERPL CALC-SCNC: 4 MMOL/L (ref 2–11)
BUN SERPL-MCNC: 20 MG/DL (ref 6–23)
CALCIUM SERPL-MCNC: 8.6 MG/DL (ref 8.3–10.4)
CHLORIDE SERPL-SCNC: 107 MMOL/L (ref 103–113)
CO2 SERPL-SCNC: 29 MMOL/L (ref 21–32)
CREAT SERPL-MCNC: 1.1 MG/DL (ref 0.8–1.5)
ERYTHROCYTE [DISTWIDTH] IN BLOOD BY AUTOMATED COUNT: 12.9 % (ref 11.9–14.6)
GLUCOSE SERPL-MCNC: 107 MG/DL (ref 65–100)
HCT VFR BLD AUTO: 45.1 % (ref 41.1–50.3)
HGB BLD-MCNC: 14.8 G/DL (ref 13.6–17.2)
MAGNESIUM SERPL-MCNC: 2.2 MG/DL (ref 1.8–2.4)
MCH RBC QN AUTO: 32.2 PG (ref 26.1–32.9)
MCHC RBC AUTO-ENTMCNC: 32.8 G/DL (ref 31.4–35)
MCV RBC AUTO: 98.3 FL (ref 82–102)
NRBC # BLD: 0 K/UL (ref 0–0.2)
PLATELET # BLD AUTO: 245 K/UL (ref 150–450)
PMV BLD AUTO: 10 FL (ref 9.4–12.3)
POTASSIUM SERPL-SCNC: 3.5 MMOL/L (ref 3.5–5.1)
RBC # BLD AUTO: 4.59 M/UL (ref 4.23–5.6)
SODIUM SERPL-SCNC: 140 MMOL/L (ref 136–146)
WBC # BLD AUTO: 9.4 K/UL (ref 4.3–11.1)

## 2024-01-01 PROCEDURE — 2140000000 HC CCU INTERMEDIATE R&B

## 2024-01-01 PROCEDURE — 6370000000 HC RX 637 (ALT 250 FOR IP)

## 2024-01-01 PROCEDURE — 6360000002 HC RX W HCPCS: Performed by: INTERNAL MEDICINE

## 2024-01-01 PROCEDURE — 6360000002 HC RX W HCPCS: Performed by: NURSE PRACTITIONER

## 2024-01-01 PROCEDURE — 36415 COLL VENOUS BLD VENIPUNCTURE: CPT

## 2024-01-01 PROCEDURE — 6370000000 HC RX 637 (ALT 250 FOR IP): Performed by: INTERNAL MEDICINE

## 2024-01-01 PROCEDURE — 99232 SBSQ HOSP IP/OBS MODERATE 35: CPT | Performed by: INTERNAL MEDICINE

## 2024-01-01 PROCEDURE — 80048 BASIC METABOLIC PNL TOTAL CA: CPT

## 2024-01-01 PROCEDURE — 85027 COMPLETE CBC AUTOMATED: CPT

## 2024-01-01 PROCEDURE — 2580000003 HC RX 258

## 2024-01-01 PROCEDURE — 83735 ASSAY OF MAGNESIUM: CPT

## 2024-01-01 PROCEDURE — 94640 AIRWAY INHALATION TREATMENT: CPT

## 2024-01-01 PROCEDURE — 71250 CT THORAX DX C-: CPT

## 2024-01-01 RX ORDER — FUROSEMIDE 10 MG/ML
40 INJECTION INTRAMUSCULAR; INTRAVENOUS 2 TIMES DAILY
Status: DISCONTINUED | OUTPATIENT
Start: 2024-01-01 | End: 2024-01-02

## 2024-01-01 RX ORDER — LOSARTAN POTASSIUM 25 MG/1
25 TABLET ORAL DAILY
Status: DISCONTINUED | OUTPATIENT
Start: 2024-01-01 | End: 2024-01-02 | Stop reason: HOSPADM

## 2024-01-01 RX ORDER — FUROSEMIDE 10 MG/ML
60 INJECTION INTRAMUSCULAR; INTRAVENOUS 2 TIMES DAILY
Status: DISCONTINUED | OUTPATIENT
Start: 2024-01-01 | End: 2024-01-01

## 2024-01-01 RX ORDER — SPIRONOLACTONE 25 MG/1
25 TABLET ORAL DAILY
Status: DISCONTINUED | OUTPATIENT
Start: 2024-01-01 | End: 2024-01-02 | Stop reason: HOSPADM

## 2024-01-01 RX ORDER — POTASSIUM CHLORIDE 20 MEQ/1
40 TABLET, EXTENDED RELEASE ORAL ONCE
Status: COMPLETED | OUTPATIENT
Start: 2024-01-01 | End: 2024-01-01

## 2024-01-01 RX ADMIN — IPRATROPIUM BROMIDE 0.5 MG: 0.5 SOLUTION RESPIRATORY (INHALATION) at 08:55

## 2024-01-01 RX ADMIN — SODIUM CHLORIDE, PRESERVATIVE FREE 5 ML: 5 INJECTION INTRAVENOUS at 20:30

## 2024-01-01 RX ADMIN — FUROSEMIDE 60 MG: 10 INJECTION, SOLUTION INTRAMUSCULAR; INTRAVENOUS at 10:00

## 2024-01-01 RX ADMIN — EMPAGLIFLOZIN 10 MG: 10 TABLET, FILM COATED ORAL at 10:00

## 2024-01-01 RX ADMIN — CARVEDILOL 3.12 MG: 3.12 TABLET, FILM COATED ORAL at 17:07

## 2024-01-01 RX ADMIN — POTASSIUM CHLORIDE 40 MEQ: 1500 TABLET, EXTENDED RELEASE ORAL at 10:00

## 2024-01-01 RX ADMIN — IPRATROPIUM BROMIDE 0.5 MG: 0.5 SOLUTION RESPIRATORY (INHALATION) at 14:01

## 2024-01-01 RX ADMIN — IPRATROPIUM BROMIDE 0.5 MG: 0.5 SOLUTION RESPIRATORY (INHALATION) at 19:28

## 2024-01-01 RX ADMIN — HYDROXYZINE HYDROCHLORIDE 10 MG: 10 TABLET ORAL at 07:35

## 2024-01-01 RX ADMIN — CARVEDILOL 3.12 MG: 3.12 TABLET, FILM COATED ORAL at 07:33

## 2024-01-01 RX ADMIN — SODIUM CHLORIDE, PRESERVATIVE FREE 10 ML: 5 INJECTION INTRAVENOUS at 10:01

## 2024-01-01 RX ADMIN — LOSARTAN POTASSIUM 25 MG: 25 TABLET, FILM COATED ORAL at 10:00

## 2024-01-01 RX ADMIN — SPIRONOLACTONE 25 MG: 25 TABLET ORAL at 10:00

## 2024-01-01 RX ADMIN — FUROSEMIDE 40 MG: 10 INJECTION, SOLUTION INTRAMUSCULAR; INTRAVENOUS at 17:07

## 2024-01-01 ASSESSMENT — PAIN SCALES - GENERAL
PAINLEVEL_OUTOF10: 0

## 2024-01-01 NOTE — PLAN OF CARE
Problem: Discharge Planning  Goal: Discharge to home or other facility with appropriate resources  Outcome: Progressing  Flowsheets (Taken 1/1/2024 0738)  Discharge to home or other facility with appropriate resources:   Identify barriers to discharge with patient and caregiver   Arrange for needed discharge resources and transportation as appropriate   Identify discharge learning needs (meds, wound care, etc)   Refer to discharge planning if patient needs post-hospital services based on physician order or complex needs related to functional status, cognitive ability or social support system     Problem: Safety - Adult  Goal: Free from fall injury  Outcome: Progressing  Flowsheets (Taken 1/1/2024 0738)  Free From Fall Injury: Instruct family/caregiver on patient safety     Problem: ABCDS Injury Assessment  Goal: Absence of physical injury  Outcome: Progressing  Flowsheets (Taken 1/1/2024 0738)  Absence of Physical Injury: Implement safety measures based on patient assessment     Problem: Risk for Elopement  Goal: Patient will not exit the unit/facility without proper excort  Outcome: Progressing  Flowsheets (Taken 1/1/2024 0738)  Nursing Interventions for Elopement Risk: Assist with personal care needs such as toileting, eating, dressing, as needed to reduce the risk of wandering     Problem: Chronic Conditions and Co-morbidities  Goal: Patient's chronic conditions and co-morbidity symptoms are monitored and maintained or improved  Outcome: Progressing  Flowsheets (Taken 1/1/2024 0738)  Care Plan - Patient's Chronic Conditions and Co-Morbidity Symptoms are Monitored and Maintained or Improved: Monitor and assess patient's chronic conditions and comorbid symptoms for stability, deterioration, or improvement

## 2024-01-01 NOTE — CARE COORDINATION
CM met with pt and mother at bedside for initial CM assessment. Pt alert and oriented x 4. Demographics, insurance, and PCP discussed and verified. Pt reported [employer] insurance and provided CM with insurance card. CM scanned insurance card and ID to media file. Pt reported new insurance with marketplace beginning 1/1/2024 and has not yet received card or sure of provider. Pt reported to CM no PCP and visits Urgent Cares and clinics when needed, last visit approximately two weeks ago.     Pt lives alone in a one level home with level entrance. Pt reported independent with ADLs and an active  in the community PTA. No current DME or HH services reported in the home.     CM consult received for no insurance. Insurance provided to CM and scanned into media file. CM discussed with pt and mother options for medication coverage, including GoodRx. Pending clinical course, potential medication assistance may be needed. Potential need for PCP referral at discharge.    Will continue to follow patient's plan of care and assist further with supportive care needs as appropriate.       01/01/24 8079   Service Assessment   Patient Orientation Alert and Oriented;Person;Place;Situation;Self   Cognition Alert   History Provided By Patient   Primary Caregiver Self   Accompanied By/Relationship mother Jie Jhaveri   Support Systems Parent   Patient's Healthcare Decision Maker is: Legal Next of Kin   PCP Verified by CM No  (No PCP reported)   Prior Functional Level Independent in ADLs/IADLs   Current Functional Level Independent in ADLs/IADLs   Can patient return to prior living arrangement Yes   Ability to make needs known: Good   Family able to assist with home care needs: Yes   Would you like for me to discuss the discharge plan with any other family members/significant others, and if so, who? Yes  (mother Jie)   Financial Resources Other (Comment)  ([employer])   Community Resources None   CM/SW Referral Financial

## 2024-01-02 VITALS
HEIGHT: 70 IN | RESPIRATION RATE: 20 BRPM | DIASTOLIC BLOOD PRESSURE: 82 MMHG | BODY MASS INDEX: 31.84 KG/M2 | HEART RATE: 109 BPM | TEMPERATURE: 97.9 F | WEIGHT: 222.4 LBS | SYSTOLIC BLOOD PRESSURE: 119 MMHG | OXYGEN SATURATION: 94 %

## 2024-01-02 LAB
ANION GAP SERPL CALC-SCNC: 5 MMOL/L (ref 2–11)
BUN SERPL-MCNC: 24 MG/DL (ref 6–23)
CALCIUM SERPL-MCNC: 9 MG/DL (ref 8.3–10.4)
CHLORIDE SERPL-SCNC: 106 MMOL/L (ref 103–113)
CO2 SERPL-SCNC: 30 MMOL/L (ref 21–32)
CREAT SERPL-MCNC: 1.3 MG/DL (ref 0.8–1.5)
ERYTHROCYTE [DISTWIDTH] IN BLOOD BY AUTOMATED COUNT: 12.9 % (ref 11.9–14.6)
GLUCOSE SERPL-MCNC: 132 MG/DL (ref 65–100)
HCT VFR BLD AUTO: 47.8 % (ref 41.1–50.3)
HGB BLD-MCNC: 15.7 G/DL (ref 13.6–17.2)
MAGNESIUM SERPL-MCNC: 2.3 MG/DL (ref 1.8–2.4)
MCH RBC QN AUTO: 32 PG (ref 26.1–32.9)
MCHC RBC AUTO-ENTMCNC: 32.8 G/DL (ref 31.4–35)
MCV RBC AUTO: 97.4 FL (ref 82–102)
NRBC # BLD: 0 K/UL (ref 0–0.2)
PLATELET # BLD AUTO: 254 K/UL (ref 150–450)
PMV BLD AUTO: 9.9 FL (ref 9.4–12.3)
POTASSIUM SERPL-SCNC: 3.7 MMOL/L (ref 3.5–5.1)
RBC # BLD AUTO: 4.91 M/UL (ref 4.23–5.6)
SODIUM SERPL-SCNC: 141 MMOL/L (ref 136–146)
WBC # BLD AUTO: 10.7 K/UL (ref 4.3–11.1)

## 2024-01-02 PROCEDURE — 94761 N-INVAS EAR/PLS OXIMETRY MLT: CPT

## 2024-01-02 PROCEDURE — 6360000002 HC RX W HCPCS: Performed by: INTERNAL MEDICINE

## 2024-01-02 PROCEDURE — 36415 COLL VENOUS BLD VENIPUNCTURE: CPT

## 2024-01-02 PROCEDURE — 99238 HOSP IP/OBS DSCHRG MGMT 30/<: CPT | Performed by: INTERNAL MEDICINE

## 2024-01-02 PROCEDURE — 6360000002 HC RX W HCPCS: Performed by: NURSE PRACTITIONER

## 2024-01-02 PROCEDURE — 6370000000 HC RX 637 (ALT 250 FOR IP): Performed by: INTERNAL MEDICINE

## 2024-01-02 PROCEDURE — 83735 ASSAY OF MAGNESIUM: CPT

## 2024-01-02 PROCEDURE — 85027 COMPLETE CBC AUTOMATED: CPT

## 2024-01-02 PROCEDURE — 94640 AIRWAY INHALATION TREATMENT: CPT

## 2024-01-02 PROCEDURE — 6370000000 HC RX 637 (ALT 250 FOR IP)

## 2024-01-02 PROCEDURE — 2580000003 HC RX 258

## 2024-01-02 PROCEDURE — 80048 BASIC METABOLIC PNL TOTAL CA: CPT

## 2024-01-02 RX ORDER — SPIRONOLACTONE 25 MG/1
25 TABLET ORAL DAILY
Status: DISCONTINUED | OUTPATIENT
Start: 2024-01-02 | End: 2024-01-02 | Stop reason: SDUPTHER

## 2024-01-02 RX ORDER — METOPROLOL SUCCINATE 25 MG/1
25 TABLET, EXTENDED RELEASE ORAL DAILY
Status: DISCONTINUED | OUTPATIENT
Start: 2024-01-03 | End: 2024-01-02 | Stop reason: HOSPADM

## 2024-01-02 RX ORDER — FUROSEMIDE 40 MG/1
40 TABLET ORAL DAILY
Status: DISCONTINUED | OUTPATIENT
Start: 2024-01-02 | End: 2024-01-02 | Stop reason: HOSPADM

## 2024-01-02 RX ORDER — LOSARTAN POTASSIUM 25 MG/1
25 TABLET ORAL DAILY
Status: DISCONTINUED | OUTPATIENT
Start: 2024-01-02 | End: 2024-01-02 | Stop reason: SDUPTHER

## 2024-01-02 RX ORDER — SPIRONOLACTONE 25 MG/1
25 TABLET ORAL DAILY
Qty: 90 TABLET | Refills: 3 | Status: SHIPPED | OUTPATIENT
Start: 2024-01-03

## 2024-01-02 RX ORDER — FUROSEMIDE 40 MG/1
40 TABLET ORAL DAILY
Qty: 90 TABLET | Refills: 3 | Status: SHIPPED | OUTPATIENT
Start: 2024-01-02

## 2024-01-02 RX ORDER — METOPROLOL SUCCINATE 25 MG/1
25 TABLET, EXTENDED RELEASE ORAL DAILY
Qty: 90 TABLET | Refills: 3 | Status: SHIPPED | OUTPATIENT
Start: 2024-01-03

## 2024-01-02 RX ORDER — LOSARTAN POTASSIUM 25 MG/1
25 TABLET ORAL DAILY
Qty: 90 TABLET | Refills: 3 | Status: SHIPPED | OUTPATIENT
Start: 2024-01-03

## 2024-01-02 RX ADMIN — FUROSEMIDE 40 MG: 10 INJECTION, SOLUTION INTRAMUSCULAR; INTRAVENOUS at 08:00

## 2024-01-02 RX ADMIN — SODIUM CHLORIDE, PRESERVATIVE FREE 10 ML: 5 INJECTION INTRAVENOUS at 08:01

## 2024-01-02 RX ADMIN — IPRATROPIUM BROMIDE 0.5 MG: 0.5 SOLUTION RESPIRATORY (INHALATION) at 09:26

## 2024-01-02 RX ADMIN — EMPAGLIFLOZIN 10 MG: 10 TABLET, FILM COATED ORAL at 08:01

## 2024-01-02 RX ADMIN — LOSARTAN POTASSIUM 25 MG: 25 TABLET, FILM COATED ORAL at 08:01

## 2024-01-02 RX ADMIN — SPIRONOLACTONE 25 MG: 25 TABLET ORAL at 08:01

## 2024-01-02 RX ADMIN — CARVEDILOL 3.12 MG: 3.12 TABLET, FILM COATED ORAL at 08:01

## 2024-01-02 ASSESSMENT — PAIN SCALES - GENERAL
PAINLEVEL_OUTOF10: 0
PAINLEVEL_OUTOF10: 0

## 2024-01-02 NOTE — DISCHARGE INSTRUCTIONS
Please have labs drawn at [facility] in one week.  Order has been placed in system.     DISPOSITION: The patient is being discharged home in stable condition on a low saturated fat, low cholesterol and low salt diet. The patient is instructed to advance activities as tolerated to the limit of fatigue or shortness of breath.  The patient is informed to monitor daily weights and maintain a 2 liter per day fluid restriction.  The patient is instructed to call the office for any shortness of breath, weight gain, or increased peripheral edema.

## 2024-01-02 NOTE — DISCHARGE SUMMARY
[facility] Cardiology Discharge Summary     Patient ID:  Naga Hidalgo  [id number]  43 y.o.  1980    Admit date: 12/29/2023    Discharge date:  1/2/2024    Admitting Physician: Elham Ramirez MD     Discharge Physician: Jake Ferrell, HIWOT - CNP/Dr. Cuevas    Admission Diagnoses: Volume overload [E87.70]    Discharge Diagnoses:   Patient Active Problem List    Diagnosis    Acute systolic heart failure (HCC)    Severe tricuspid regurgitation    Severe mitral regurgitation    Pulmonary hypertension (HCC)    Methamphetamine use (HCC)    Volume overload       Cardiology Procedures this admission:  EchoCardiogram  Consults: none    Hospital Course: Patient presented to the office after echo. He presented for echo on 12/29/2023 and was found to have 4 chamber dilatation, severe AV valve regurgitation and severely depressed EF of 25% and RVSP 61. HR was noted to be 130s.    Patient was evaluated and subsequently admitted for further cardiac evaluation and treatment.    UDS was found to be +amphetamines.  He was started on IVP lasix which was transitioned to PO.  He was -9.9L at discharge.  Patient will need C in near future, will defer to primary cardiologist as to when.      Echocardiogram  showed:   12/29/23    ECHO (TTE) COMPLETE (PRN CONTRAST/BUBBLE/STRAIN/3D) 12/29/2023 12:13 PM (Final)    Interpretation Summary    Left Ventricle: Severely reduced left ventricular systolic function. EF by 2D Simpsons Biplane is 25%. Left ventricle is severely dilated. Normal wall thickness. Severe global hypokinesis present. Abnormal diastolic function.    Right Ventricle: Right ventricle is moderately dilated. RV basal diameter is 4.8 cm. RV mid diameter is 3.8 cm. Moderately reduced systolic function. TAPSE is 1.3 cm.    Mitral Valve: Moderate annular dilation. Severe regurgitation with a centrally directed jet.    Tricuspid Valve: Moderate annular dilation. Severe regurgitation with a centrally

## 2024-01-02 NOTE — PLAN OF CARE
Problem: Discharge Planning  Goal: Discharge to home or other facility with appropriate resources  1/2/2024 0141 by Kimberlyn Avalos RN  Outcome: Progressing  Flowsheets (Taken 1/1/2024 2023)  Discharge to home or other facility with appropriate resources: Identify barriers to discharge with patient and caregiver  1/1/2024 1202 by Faby Bowens RN  Outcome: Progressing  Flowsheets (Taken 1/1/2024 0738)  Discharge to home or other facility with appropriate resources:   Identify barriers to discharge with patient and caregiver   Arrange for needed discharge resources and transportation as appropriate   Identify discharge learning needs (meds, wound care, etc)   Refer to discharge planning if patient needs post-hospital services based on physician order or complex needs related to functional status, cognitive ability or social support system     Problem: Safety - Adult  Goal: Free from fall injury  1/2/2024 0141 by Kimberlyn Avalos RN  Outcome: Progressing  Flowsheets (Taken 1/1/2024 2023)  Free From Fall Injury: Instruct family/caregiver on patient safety  1/1/2024 1202 by Faby Bowens RN  Outcome: Progressing  Flowsheets (Taken 1/1/2024 0738)  Free From Fall Injury: Instruct family/caregiver on patient safety     Problem: ABCDS Injury Assessment  Goal: Absence of physical injury  1/2/2024 0141 by Kimberlyn Avalos RN  Outcome: Progressing  Flowsheets (Taken 1/1/2024 2023)  Absence of Physical Injury: Implement safety measures based on patient assessment  1/1/2024 1202 by Faby Bowens RN  Outcome: Progressing  Flowsheets (Taken 1/1/2024 0738)  Absence of Physical Injury: Implement safety measures based on patient assessment     Problem: Risk for Elopement  Goal: Patient will not exit the unit/facility without proper excort  1/2/2024 0141 by Kimberlyn Avalos RN  Outcome: Progressing  Flowsheets (Taken 1/1/2024 2023)  Nursing Interventions for Elopement Risk: Make sure patient has all necessary

## 2024-01-02 NOTE — PLAN OF CARE
Problem: Discharge Planning  Goal: Discharge to home or other facility with appropriate resources  1/2/2024 1122 by Amanda Howard RN  Outcome: Adequate for Discharge  Flowsheets (Taken 1/2/2024 0805 by Faby Bowens, RN)  Discharge to home or other facility with appropriate resources:   Identify barriers to discharge with patient and caregiver   Arrange for needed discharge resources and transportation as appropriate   Identify discharge learning needs (meds, wound care, etc)   Refer to discharge planning if patient needs post-hospital services based on physician order or complex needs related to functional status, cognitive ability or social support system  1/2/2024 0141 by Kimberlyn Avalos RN  Outcome: Progressing  Flowsheets (Taken 1/1/2024 2023)  Discharge to home or other facility with appropriate resources: Identify barriers to discharge with patient and caregiver     Problem: Safety - Adult  Goal: Free from fall injury  1/2/2024 1122 by Amanda Howard RN  Outcome: Adequate for Discharge  Flowsheets (Taken 1/2/2024 0805 by Faby Bowens, RN)  Free From Fall Injury: Instruct family/caregiver on patient safety  1/2/2024 0141 by Kimberlyn Avalos RN  Outcome: Progressing  Flowsheets (Taken 1/1/2024 2023)  Free From Fall Injury: Instruct family/caregiver on patient safety     Problem: ABCDS Injury Assessment  Goal: Absence of physical injury  1/2/2024 1122 by Amanda Howard RN  Outcome: Adequate for Discharge  Flowsheets (Taken 1/2/2024 0805 by Faby Bowens, RN)  Absence of Physical Injury: Implement safety measures based on patient assessment  1/2/2024 0141 by Kimberlyn Avalos RN  Outcome: Progressing  Flowsheets (Taken 1/1/2024 2023)  Absence of Physical Injury: Implement safety measures based on patient assessment     Problem: Risk for Elopement  Goal: Patient will not exit the unit/facility without proper excort  1/2/2024 1122 by Amanda Howard RN  Outcome: Adequate for

## 2024-01-02 NOTE — NURSE NAVIGATOR
CHF teaching completed.CHF background completed. Teaching emphasis on Call Cardiology STAT ,if any of the following are noted:  Short of Breath; with activity or without/ while reclined, Generalize weakness, edema are noted individually or grouped.  Cardiac Diet  and salt/fluid restrictions covered.  Daily WTs emphasized.  Planner with summarization sheet provided with cardiology service and phone number and bathroom scale offered.  Total time @ BS is 1 hour and pt verbalize understanding/past post test.  Pt instructed to call myself, if any questions occur.

## 2024-01-02 NOTE — CARE COORDINATION
Discharge order is in. Pt is discharging home today in stable condition. Pt DOES have insurance; [employer] ([employer]). Covering SW scanned his insurance card under media yesterday. Referral sent to [facility] for PCP follow up. No other discharge needs identified. Tx goals met.     01/02/24 1131   Services At/After Discharge   Transition of Care Consult (CM Consult) Discharge Planning    Resource Information Provided? No   Mode of Transport at Discharge Other (see comment)  (SO)   Confirm Follow Up Transport Family   Condition of Participation: Discharge Planning   The Patient and/or Patient Representative was provided with a Choice of Provider? Patient   The Patient and/Or Patient Representative agree with the Discharge Plan? Yes   Freedom of Choice list was provided with basic dialogue that supports the patient's individualized plan of care/goals, treatment preferences, and shares the quality data associated with the providers?  Yes

## 2024-01-03 ENCOUNTER — TELEPHONE (OUTPATIENT)
Age: 44
End: 2024-01-03

## 2024-01-03 NOTE — PROGRESS NOTES
[facility] CARDIOLOGY PROGRESS NOTE           1/2/2024 9:30 AM    Admit Date: 12/29/2023      Subjective:   No sp or sob    Objective:      Vitals:    01/02/24 0450 01/02/24 0701 01/02/24 0805 01/02/24 0926   BP: 112/88  119/82    Pulse: 59  56 (!) 109   Resp: 18  20 20   Temp: 97.2 °F (36.2 °C)  97.9 °F (36.6 °C)    TempSrc:   Oral    SpO2: 96%  97% 94%   Weight:  100.9 kg (222 lb 6.4 oz)     Height:           Physical Exam:  General-No Acute Distress  Neck- supple, no JVD  CV- regular rate and rhythm no MRG  Lung- clear bilaterally  Abd- soft, nontender, nondistended  Ext- tr edema bilaterally.  Skin- warm and dry    Data Review:   Recent Labs     01/01/24  0327 01/02/24  0301    141   K 3.5 3.7   MG 2.2 2.3   BUN 20 24*   WBC 9.4 10.7   HGB 14.8 15.7   HCT 45.1 47.8    254       Assessment/Plan:     Principal Problem:    Volume overload  Plan:   Active Problems:    Acute systolic heart failure (HCC)  Plan:     Severe tricuspid regurgitation  Plan:     Severe mitral regurgitation  Plan:     Pulmonary hypertension (HCC)  Plan:     Methamphetamine use (HCC)  Plan:   ////    Improved.  Home today         ANABEL RIVAS MD  1/2/2024 9:30 AM   
   12/29/23 2134   Oxygen Therapy/Pulse Ox   O2 Device None (Room air)   Pulse (!) 102   Respirations 20   SpO2 98 %   Pulse Oximeter Device Mode Continuous   Pulse Oximeter Device Location Finger   Pulse Oximeter Low SpO2 Alarm 81   $Pulse Oximeter $Overnight     Patient placed on CAP #50 for DEEPA on RA per order. RN notified and RT will continue to monitor accordingly.  
0445: Pt had 14 beat run of Vtach with return to baseline sinus tach 110-120bpm. NP Shalini Obando aware.  
Discharge instructions reviewed with patient. Prescriptions given for cozaar, metoprolol, aldactone, lasix and med info sheets provided for all new medications. Opportunity for questions provided. Patient voiced understanding of all discharge instructions.     
Spiritual Care Visit. Initial visit.    Patient was visited by Spiritual Care Volunteer.    Entered by Aden Guaman, Staff . Isis, Kenyon  
TRANSFER - IN REPORT:    Assessment completed upon patient’s arrival to unit and care assumed.     Patient received to room 307. Patient connected to monitor and assessment completed. Plan of care reviewed. Patient oriented to room and call light. Patient aware to use call light to communicate any chest pain or needs.     Admission skin assessment completed with second RN and reveals the following: Sacrum and heels are clean, dry, and intact. LLE scars and scattered scarring and bruising bodywide. Tattoos noted. Scattered abrasions to BUE. +2BLE pitting edema noted, especially to bilateral feet.     
tricuspid regurgitation-with pulmonary hypertension, reassess after euvolemia.      Severe mitral regurgitation-continue volume removal and augment meds for LV systolic dysfunction.  Hopefully functional MR due to LV dysfunction.      Pulmonary hypertension (HCC)-reassess echo after medical maximization and euvolemia.      Methamphetamine use (HCC)-cessation counseling in the outpatient setting      *Volume overload-improving but still has a long way to go.  See above.  Augment IV Lasix and recheck labs in the morning, replete electrolytes daily.       ALEXANDRO UMANZOR MD

## 2024-01-03 NOTE — TELEPHONE ENCOUNTER
Care Transitions Initial Follow Up Call    Call within 2 business days of discharge: Yes     Patient: Naga Hidalgo Patient : 1980 MRN: [id number]    RARS: Readmission Risk Score: 5.2  Spoke with: PATIENT MOM  Non-face-to-face services provided:  Transitional Care fu after DC from [facility]  24 volume overload.I spoke w/pt Mom she said pt.is doing well./70.He is weighing daily and wt.is stable.No increased swelling.SOB is stable.He has all his meds.I went over CHF monitoring ,diet,labs needed,medication importance,fluid restrictions and fu appt.All questions answered.Pt.mom will have pt.call PRN.    Follow Up  Future Appointments   Date Time Provider Department Center   2024 11:45 AM Jose Beck MD [facility]   2024  8:15 AM Jose Beck MD [facility]       Maggie Rush RN

## 2024-01-04 ENCOUNTER — TELEPHONE (OUTPATIENT)
Dept: INTERNAL MEDICINE CLINIC | Facility: CLINIC | Age: 44
End: 2024-01-04

## 2024-01-04 NOTE — TELEPHONE ENCOUNTER
Spoke with patient regarding TCM care and PCP follow-up.  He has an appointment with his cardiologist on January 9 and is currently waiting for a new insurance card before establishing care with a [facility] PCP.  Otherwise he is feeling much better since hospital discharge.

## 2024-01-05 ENCOUNTER — TELEPHONE (OUTPATIENT)
Age: 44
End: 2024-01-05

## 2024-01-05 NOTE — TELEPHONE ENCOUNTER
Jose Beck MD  You3 minutes ago (3:38 PM)       We will discuss when he follows up in the office which I believe is next week

## 2024-01-05 NOTE — TELEPHONE ENCOUNTER
Pt.Mom Jie says pt.is hard time sleeping since he has been home from the hospital.He is staying tired even when awake.He does not have a PCP.Would  give anything for anxiety/Sleep?        [facility] so that he can establish care w/a PCP.

## 2024-01-05 NOTE — TELEPHONE ENCOUNTER
Patient is experiencing anxiety & having sleepless nights. Mom-Jie wants to know if Dr. Beck would please prescribe his anxiety & and something to help him sleep. Patient recently had a hospital stay for heart failure.

## 2024-01-09 ENCOUNTER — OFFICE VISIT (OUTPATIENT)
Age: 44
End: 2024-01-09

## 2024-01-09 VITALS
WEIGHT: 231 LBS | SYSTOLIC BLOOD PRESSURE: 100 MMHG | DIASTOLIC BLOOD PRESSURE: 62 MMHG | HEART RATE: 84 BPM | BODY MASS INDEX: 33.07 KG/M2 | HEIGHT: 70 IN

## 2024-01-09 DIAGNOSIS — Z72.0 TOBACCO USE: ICD-10-CM

## 2024-01-09 DIAGNOSIS — I38 VALVULAR HEART DISEASE: ICD-10-CM

## 2024-01-09 DIAGNOSIS — I50.42 CHRONIC COMBINED SYSTOLIC AND DIASTOLIC CHF (CONGESTIVE HEART FAILURE) (HCC): Primary | ICD-10-CM

## 2024-01-09 DIAGNOSIS — I10 ESSENTIAL HYPERTENSION: ICD-10-CM

## 2024-01-09 PROBLEM — R06.00 DYSPNEA: Status: ACTIVE | Noted: 2024-01-09

## 2024-01-09 NOTE — PROGRESS NOTES
[facility] CARDIOLOGY, [address], [address]  [place] [address]  PHONE: [phone]    SUBJECTIVE: /HPI  Naga Hidalgo (1980) is a 43 y.o. male seen for a follow up visit regarding the following:   Specialty Problems          Cardiology Problems    Acute systolic heart failure (HCC)        Pulmonary hypertension (HCC)        Severe mitral regurgitation        Severe tricuspid regurgitation         PT has recently in the hospital because he was covered in hives and was swollen. They treated him for the hives and found out that he had HFrEF. PT complained of LE edema and abd distention. HE had reported shortness of breath and coughing for a month prior to going to ED.     Today, pt has some shortness of breath while walking into the office. He does not have any more LE edema today in the office. Pt has a prescription for Lasix 40mg with possibility of need of refill.         Past Medical History, Past Surgical History, Family history, Social History, and Medications were all reviewed with the patient today and updated as necessary.  Denies any drug use    Allergies   Allergen Reactions    Aleve [Naproxen] Hives     Past Medical History:   Diagnosis Date    CHF (congestive heart failure) (HCC)     Kidney stone      Past Surgical History:   Procedure Laterality Date    TYMPANOSTOMY TUBE PLACEMENT       Family History   Problem Relation Age of Onset    Arthritis Mother     Cancer Father         type unknown      Social History     Tobacco Use    Smoking status: Every Day     Current packs/day: 0.50     Types: Cigarettes    Smokeless tobacco: Never   Substance Use Topics    Alcohol use: Never       Current Outpatient Medications:     losartan (COZAAR) 25 MG tablet, Take 1 tablet by mouth daily, Disp: 90 tablet, Rfl: 3    metoprolol succinate (TOPROL XL) 25 MG extended release tablet, Take 1 tablet by mouth daily, Disp: 90 tablet, Rfl: 3    furosemide (LASIX) 40 MG tablet, Take 1 tablet by mouth daily,

## 2024-01-10 ENCOUNTER — HOSPITAL ENCOUNTER (OUTPATIENT)
Age: 44
Setting detail: OUTPATIENT SURGERY
Discharge: HOME | End: 2024-01-10
Attending: INTERNAL MEDICINE | Admitting: INTERNAL MEDICINE
Payer: COMMERCIAL

## 2024-01-10 VITALS
BODY MASS INDEX: 31.64 KG/M2 | WEIGHT: 221 LBS | HEART RATE: 110 BPM | RESPIRATION RATE: 18 BRPM | HEIGHT: 70 IN | SYSTOLIC BLOOD PRESSURE: 120 MMHG | DIASTOLIC BLOOD PRESSURE: 88 MMHG | OXYGEN SATURATION: 97 %

## 2024-01-10 DIAGNOSIS — R06.00 DYSPNEA: ICD-10-CM

## 2024-01-10 LAB
ECHO BSA: 2.22 M2
EKG ATRIAL RATE: 114 BPM
EKG DIAGNOSIS: NORMAL
EKG P AXIS: 49 DEGREES
EKG P-R INTERVAL: 148 MS
EKG Q-T INTERVAL: 348 MS
EKG QRS DURATION: 92 MS
EKG QTC CALCULATION (BAZETT): 479 MS
EKG R AXIS: 71 DEGREES
EKG T AXIS: 76 DEGREES
EKG VENTRICULAR RATE: 114 BPM

## 2024-01-10 PROCEDURE — 2500000003 HC RX 250 WO HCPCS: Performed by: INTERNAL MEDICINE

## 2024-01-10 PROCEDURE — C1769 GUIDE WIRE: HCPCS | Performed by: INTERNAL MEDICINE

## 2024-01-10 PROCEDURE — 93460 R&L HRT ART/VENTRICLE ANGIO: CPT | Performed by: INTERNAL MEDICINE

## 2024-01-10 PROCEDURE — 93010 ELECTROCARDIOGRAM REPORT: CPT | Performed by: INTERNAL MEDICINE

## 2024-01-10 PROCEDURE — 99153 MOD SED SAME PHYS/QHP EA: CPT | Performed by: INTERNAL MEDICINE

## 2024-01-10 PROCEDURE — 6360000004 HC RX CONTRAST MEDICATION: Performed by: INTERNAL MEDICINE

## 2024-01-10 PROCEDURE — 99152 MOD SED SAME PHYS/QHP 5/>YRS: CPT | Performed by: INTERNAL MEDICINE

## 2024-01-10 PROCEDURE — 93005 ELECTROCARDIOGRAM TRACING: CPT | Performed by: INTERNAL MEDICINE

## 2024-01-10 PROCEDURE — 2580000003 HC RX 258: Performed by: INTERNAL MEDICINE

## 2024-01-10 PROCEDURE — 2709999900 HC NON-CHARGEABLE SUPPLY: Performed by: INTERNAL MEDICINE

## 2024-01-10 PROCEDURE — C1894 INTRO/SHEATH, NON-LASER: HCPCS | Performed by: INTERNAL MEDICINE

## 2024-01-10 PROCEDURE — C1751 CATH, INF, PER/CENT/MIDLINE: HCPCS | Performed by: INTERNAL MEDICINE

## 2024-01-10 PROCEDURE — 6360000002 HC RX W HCPCS: Performed by: INTERNAL MEDICINE

## 2024-01-10 RX ORDER — HEPARIN SODIUM 200 [USP'U]/100ML
INJECTION, SOLUTION INTRAVENOUS CONTINUOUS PRN
Status: COMPLETED | OUTPATIENT
Start: 2024-01-10 | End: 2024-01-10

## 2024-01-10 RX ORDER — ASPIRIN 81 MG/1
324 TABLET, CHEWABLE ORAL ONCE
Status: CANCELLED | OUTPATIENT
Start: 2024-01-10 | End: 2024-01-10

## 2024-01-10 RX ORDER — SODIUM CHLORIDE 9 MG/ML
INJECTION, SOLUTION INTRAVENOUS CONTINUOUS
Status: DISCONTINUED | OUTPATIENT
Start: 2024-01-10 | End: 2024-01-10 | Stop reason: HOSPADM

## 2024-01-10 RX ORDER — MIDAZOLAM HYDROCHLORIDE 1 MG/ML
INJECTION INTRAMUSCULAR; INTRAVENOUS PRN
Status: DISCONTINUED | OUTPATIENT
Start: 2024-01-10 | End: 2024-01-10 | Stop reason: HOSPADM

## 2024-01-10 RX ORDER — NITROGLYCERIN 20 MG/100ML
INJECTION INTRAVENOUS PRN
Status: DISCONTINUED | OUTPATIENT
Start: 2024-01-10 | End: 2024-01-10 | Stop reason: HOSPADM

## 2024-01-10 RX ORDER — LIDOCAINE HYDROCHLORIDE 10 MG/ML
INJECTION, SOLUTION INFILTRATION; PERINEURAL PRN
Status: DISCONTINUED | OUTPATIENT
Start: 2024-01-10 | End: 2024-01-10 | Stop reason: HOSPADM

## 2024-01-10 RX ORDER — METOPROLOL SUCCINATE 50 MG/1
50 TABLET, EXTENDED RELEASE ORAL DAILY
Qty: 90 TABLET | Refills: 3 | Status: SHIPPED | OUTPATIENT
Start: 2024-01-10

## 2024-01-10 RX ADMIN — SODIUM CHLORIDE: 9 INJECTION, SOLUTION INTRAVENOUS at 10:31

## 2024-01-10 NOTE — PROGRESS NOTES
R band deflation completed. Right radial dressed with gauze and tegaderm using sterile technique. No bleeding or hematoma. Tolerated without difficulty

## 2024-01-10 NOTE — PROGRESS NOTES
[facility] RR/C RtBr with Dr Beck.  No intervention, CHF noted.    Heparin 5000u  Versed 2mg  TR @ 12, pressure dressing on RtBr    Report to receiving RN.  Pt transferred to CPRU.

## 2024-01-10 NOTE — PROGRESS NOTES
Patient received to CPRU room # 10  Ambulatory from [facility]. Patient scheduled for R/LHC today with Dr Beck. Procedure reviewed & questions answered, voiced good understanding consent obtained & placed on chart. All medications and medical history reviewed. Will prep patient per orders. Patient & family updated on plan of care.      The patient has a fraility score of 3-MANAGING WELL, based on ambulation.

## 2024-01-10 NOTE — PROGRESS NOTES
Discharge instructions given per orders, voiced good understanding of post procedure care, medications & follow up care. Denies any questions

## 2024-01-10 NOTE — PROGRESS NOTES
TRANSFER - IN REPORT:    Verbal report received from william CEJA on Naga Hidalgo  being received from cath lab for routine progression of patient care      Report consisted of patient's Situation, Background, Assessment and   Recommendations(SBAR).     Information from the following report(s) Nurse Handoff Report was reviewed with the receiving nurse.    Opportunity for questions and clarification was provided.      Assessment completed upon patient's arrival to unit and care assumed.

## 2024-01-10 NOTE — DISCHARGE INSTRUCTIONS
HEART CATHETERIZATION/ANGIOGRAPHY DISCHARGE INSTRUCTIONS    Check puncture site frequently for swelling or bleeding. If there is any bleeding, apply pressure over the area with a clean towel or washcloth and call 911. Notify your doctor for any redness, swelling, drainage, or oozing from the puncture site. Notify your doctor for any fever or chills.  If the extremity becomes cold, numb, or painful call [facility] at [phone].  Activity should be limited for the next 48 hours. No heavy lifting, pushing, pulling  or strenuous activity for 48 hours. No heavy lifting (anything over 10 pounds) for 3 days.  You may resume your usual diet. Drink more fluids than usual.  Have a responsible person drive you home and stay with you for at least 24 hours after your heart catheterization/angiography.  You may remove bandage from your right wrist and right arm in 24 hours. You may shower in 24 hours. No tub baths, hot tubs, or swimming for 1 week. Do not place any lotions, creams, powders, or ointments over puncture site for 1 week. You may place a clean band-aid over the puncture site each day for 5 days. Change daily.        Sedation for a Medical Procedure: Care Instructions     You were given a sedative medication during your visit. While many of the effects will have worn   off before you leave; you may continue to feel some effects for several hours.      Common side effects from sedation include:  Feeling sleepy. (Your doctors and nurses will make sure you are not too sleepy to go home.)  Nausea and vomiting. This usually does not last long.  Feeling tired.     How can you care for yourself at home?  Activity    Don't do anything for 24 hours that requires attention to detail. It takes time for the medicine effects to completely wear off.     Do not make important legal decisions for 24 hours.     Do not sign any legal documents for 24 hours.     Do not drink alcohol today     For your safety, you should not drive

## 2024-01-30 ENCOUNTER — OFFICE VISIT (OUTPATIENT)
Age: 44
End: 2024-01-30

## 2024-01-30 VITALS
DIASTOLIC BLOOD PRESSURE: 64 MMHG | WEIGHT: 223 LBS | BODY MASS INDEX: 31.22 KG/M2 | SYSTOLIC BLOOD PRESSURE: 108 MMHG | HEIGHT: 71 IN | HEART RATE: 62 BPM

## 2024-01-30 DIAGNOSIS — I38 VALVULAR HEART DISEASE: ICD-10-CM

## 2024-01-30 DIAGNOSIS — I50.42 CHRONIC COMBINED SYSTOLIC AND DIASTOLIC CHF (CONGESTIVE HEART FAILURE) (HCC): Primary | ICD-10-CM

## 2024-01-30 DIAGNOSIS — I10 ESSENTIAL HYPERTENSION: ICD-10-CM

## 2024-01-30 PROCEDURE — 3078F DIAST BP <80 MM HG: CPT | Performed by: INTERNAL MEDICINE

## 2024-01-30 PROCEDURE — 3074F SYST BP LT 130 MM HG: CPT | Performed by: INTERNAL MEDICINE

## 2024-01-30 PROCEDURE — 99214 OFFICE O/P EST MOD 30 MIN: CPT | Performed by: INTERNAL MEDICINE

## 2024-01-30 NOTE — PROGRESS NOTES
(COZAAR) 25 MG tablet, Take 1 tablet by mouth daily, Disp: 90 tablet, Rfl: 3    furosemide (LASIX) 40 MG tablet, Take 1 tablet by mouth daily, Disp: 90 tablet, Rfl: 3    spironolactone (ALDACTONE) 25 MG tablet, Take 1 tablet by mouth daily, Disp: 90 tablet, Rfl: 3    albuterol sulfate HFA (PROVENTIL;VENTOLIN;PROAIR) 108 (90 Base) MCG/ACT inhaler, INHALE 2 PUFFS BY MOUTH FOUR TIMES DAILY, Disp: , Rfl:     ROS:  Review of Systems - General ROS: negative for - chills, fatigue or fever  Hematological and Lymphatic ROS: negative for - bleeding problems, bruising or jaundice  Respiratory ROS: no cough, shortness of breath, or wheezing  Cardiovascular ROS: no chest pain or dyspnea on exertion  Gastrointestinal ROS: no abdominal pain, change in bowel habits, or black or bloody stools  Neurological ROS: no TIA or stroke symptoms  All other systems negative.    Wt Readings from Last 3 Encounters:   01/30/24 101.2 kg (223 lb)   01/10/24 100.2 kg (221 lb)   01/09/24 104.8 kg (231 lb)     Temp Readings from Last 3 Encounters:   01/02/24 97.9 °F (36.6 °C) (Oral)   11/27/23 97.9 °F (36.6 °C) (Oral)     BP Readings from Last 3 Encounters:   01/30/24 108/64   01/10/24 120/88   01/09/24 100/62     Pulse Readings from Last 3 Encounters:   01/30/24 62   01/10/24 (!) 110   01/09/24 84       PHYSICAL EXAM:  /64   Pulse 62   Ht 1.803 m (5' 11\")   Wt 101.2 kg (223 lb)   BMI 31.10 kg/m²   Physical Examination: General appearance - alert, well appearing, and in no distress  Mental status - alert, oriented to person, place, and time  Eyes - pupils equal and reactive, extraocular eye movements intact  Neck/lymph - supple, no significant adenopathy  Chest/lungs - clear to auscultation, no rales or rhonchi, symmetric air entry; wheezing to auscultation in bilateral lungs.  Heart/CV - normal rate, regular rhythm, normal S1, S2, no murmurs, rubs, clicks or gallops  Abdomen/GI - soft, nontender, nondistended, no masses or

## 2024-02-19 ENCOUNTER — TELEPHONE (OUTPATIENT)
Age: 44
End: 2024-02-19

## 2024-02-19 NOTE — TELEPHONE ENCOUNTER
Pt's mother lvm on procedure line stating the pt's insurance is not accepted at the sleep center he was referred to and asks that we send a referral to a different sleep center.

## 2024-02-21 ENCOUNTER — TELEPHONE (OUTPATIENT)
Age: 44
End: 2024-02-21

## 2024-02-21 NOTE — TELEPHONE ENCOUNTER
Pt states the sleep center does not take their insurance.  Pt founds [facility] will take it and wants to know if orders can be sent there for the sleep study to be done.    Fax:  [phone]

## 2024-02-22 ENCOUNTER — TELEPHONE (OUTPATIENT)
Age: 44
End: 2024-02-22

## 2024-02-22 NOTE — TELEPHONE ENCOUNTER
Disability forms received and reviewed with Dr Beck.   Dr Beck states patient is totally disabled from a Cardiac standpoint. HFrEF 10-15%. Sever MR and severe TR. Ultimately may need Heart Transplant.    Disability forms completed and signed by Dr Beck. Forms faxed to [facility] ATTN: Ashia @ [phone]. Fax confirmation received.     Forms sent to Medical records to be scanned in the pt's chart.

## 2024-02-26 NOTE — TELEPHONE ENCOUNTER
Pt calling as pt only had 1 tablet for today.  Needs to know if they can  any samples tomorrow:     empagliflozin (JARDIANCE) 10 MG tablet      Also wants to know if there is a cost effective alternate.

## 2024-02-27 ENCOUNTER — TELEPHONE (OUTPATIENT)
Age: 44
End: 2024-02-27

## 2024-02-27 NOTE — TELEPHONE ENCOUNTER
MEDICATION REFILL REQUEST      Name of Medication:  jardiance  Dose:  10 mg  Frequency:    Quantity:    Days' supply:  90      [facility] [id number]

## 2024-02-27 NOTE — TELEPHONE ENCOUNTER
Called and gave pts mom the [facility] phone number and she is going to set up an account and give me the information so I can send a prescription to them. Also called about the sleep study referral I sent to [facility]. Will call them.

## 2024-02-28 ENCOUNTER — TELEPHONE (OUTPATIENT)
Age: 44
End: 2024-02-28

## 2024-02-28 NOTE — TELEPHONE ENCOUNTER
Called and spoke with mom let her know that I sent the [facility] to [facility] this morning and I will resend the sleep study to the right fax number at [phone]

## 2024-03-05 ENCOUNTER — TELEPHONE (OUTPATIENT)
Age: 44
End: 2024-03-05

## 2024-03-05 NOTE — TELEPHONE ENCOUNTER
Pt mother states that the referral for the sleep study never was sent to [facility] Fax: [phone]

## 2024-03-06 ENCOUNTER — TELEPHONE (OUTPATIENT)
Age: 44
End: 2024-03-06

## 2024-03-06 NOTE — TELEPHONE ENCOUNTER
Kira with [facility] called stating she needs :    Specific Orders for sleep study  Received cover sheet but no specifics    Please call and advise.    Please re fax

## 2024-03-12 ENCOUNTER — OFFICE VISIT (OUTPATIENT)
Age: 44
End: 2024-03-12

## 2024-03-12 VITALS
WEIGHT: 256 LBS | BODY MASS INDEX: 35.84 KG/M2 | DIASTOLIC BLOOD PRESSURE: 82 MMHG | HEART RATE: 80 BPM | SYSTOLIC BLOOD PRESSURE: 110 MMHG | HEIGHT: 71 IN

## 2024-03-12 DIAGNOSIS — I50.21 ACUTE SYSTOLIC HEART FAILURE (HCC): Primary | ICD-10-CM

## 2024-03-12 PROCEDURE — 99214 OFFICE O/P EST MOD 30 MIN: CPT | Performed by: INTERNAL MEDICINE

## 2024-03-12 NOTE — PROGRESS NOTES
[facility] CARDIOLOGY, [address], [address]  [place] [address]  PHONE: [phone]    SUBJECTIVE: /HPI  Naga Hidalgo (1980) is a 43 y.o. male seen for a follow up visit regarding the following:   Specialty Problems          Cardiology Problems    Acute systolic heart failure (HCC)        Pulmonary hypertension (HCC)        Severe mitral regurgitation        Severe tricuspid regurgitation           Patient is here for follow-up of HFrEF. No chest pain or palpitations. Has baseline SOB but no worse at this visit. Reports some swelling of LLE but controlled with lasix.No new issues or concerns. Is not candidate for YOHANNES    Past Medical History, Past Surgical History, Family history, Social History, and Medications were all reviewed with the patient today and updated as necessary.    Allergies   Allergen Reactions    Aleve [Naproxen] Hives     Past Medical History:   Diagnosis Date    CHF (congestive heart failure) (HCC)     Kidney stone      Past Surgical History:   Procedure Laterality Date    CARDIAC PROCEDURE N/A 1/10/2024    Left and right heart cath / coronary angiography performed by Jose Beck MD at [facility] CARDIAC CATH LAB    TYMPANOSTOMY TUBE PLACEMENT       Family History   Problem Relation Age of Onset    Arthritis Mother     Cancer Father         type unknown      Social History     Tobacco Use    Smoking status: Every Day     Current packs/day: 0.50     Types: Cigarettes    Smokeless tobacco: Never   Substance Use Topics    Alcohol use: Never       Current Outpatient Medications:     empagliflozin (JARDIANCE) 10 MG tablet, Take 1 tablet by mouth daily, Disp: 90 tablet, Rfl: 3    albuterol (PROVENTIL) (5 MG/ML) 0.5% nebulizer solution, Take 1 mL by nebulization 4 times daily as needed for Wheezing, Disp: 120 each, Rfl: 3    metoprolol succinate (TOPROL XL) 50 MG extended release tablet, Take 1 tablet by mouth daily, Disp: 90 tablet, Rfl: 3    losartan (COZAAR) 25 MG tablet, Take 1

## 2024-03-14 ENCOUNTER — TELEPHONE (OUTPATIENT)
Age: 44
End: 2024-03-14

## 2024-03-14 NOTE — TELEPHONE ENCOUNTER
----- Message from Jose Beck MD sent at 3/12/2024  4:42 PM EDT -----  Please refer this patient to the [place] [facility] of the [facility] cardiac transplant service

## 2024-03-14 NOTE — TELEPHONE ENCOUNTER
February 5, 2018       Donavan Chancedricks  [address] [place] [address]      Dear Mr. ChanceJeremy,    This letter confirms that the date of your procedure with Teddy Lawrence MD on February 13, 2018, has been rescheduled to February 22, 2018. Someone from the hospital will call you 3-5 business days prior with your procedure and arrival times. If you have not received a call from the Pre-admission Testing nurse within 3 business days of your scheduled procedure, please call [phone] for your time of procedure and pre-procedure instructions.         Please register at:    [address] [id number] [phone]      Please refer to preoperative instructions in the letter previously sent to you. If you have any questions or need to reschedule, please contact me at the telephone number and extension listed below.     Sincerely,  Darlene [phone]  Surgery Scheduler for Teddy Lawrence MD   [facility] Pre-Admit Department       Called to get a fax number to send over referral for pt. JS

## 2024-03-14 NOTE — TELEPHONE ENCOUNTER
----- Message from Jose Beck MD sent at 3/12/2024  4:42 PM EDT -----  Please refer this patient to the [place] [facility] of the [facility] cardiac transplant service

## 2024-03-21 ENCOUNTER — TELEPHONE (OUTPATIENT)
Age: 44
End: 2024-03-21

## 2024-03-21 DIAGNOSIS — R53.83 FATIGUE: Primary | ICD-10-CM

## 2024-03-21 NOTE — TELEPHONE ENCOUNTER
Kira with [facility] called on behalf of the pt to get specifics about the referral that was faxed to them. States the referral they received is not complete and is confusing.

## 2024-09-20 ENCOUNTER — TELEPHONE (OUTPATIENT)
Age: 44
End: 2024-09-20

## (undated) DEVICE — MICROPUNCTURE INTRODUCER SET SILHOUETTE TRANSITIONLESS WITH NITINOL WIRE GUIDE: Brand: MICROPUNCTURE

## (undated) DEVICE — GUIDEWIRE VASC L260CM DIA0.035IN RAD 3MM J TIP L7CM PTFE

## (undated) DEVICE — CATHETER THRMDIL 7FR L110CM STD PULM ART 4 INFUS LUMN SWN

## (undated) DEVICE — CATHETER COR DIAG 4.0 5FR 100CM 2 SIDE H

## (undated) DEVICE — GLIDESHEATH SLENDER STAINLESS STEEL KIT: Brand: GLIDESHEATH SLENDER

## (undated) DEVICE — CATHETER DIAG AD 5FR L110CM 145DEG COR GRY HYDRPHLC NYL

## (undated) DEVICE — BAND COMPR L24CM REG CLR PLAS HEMSTAT EXT HK AND LOOP RETEN